# Patient Record
Sex: FEMALE | Race: WHITE | NOT HISPANIC OR LATINO | Employment: FULL TIME | URBAN - METROPOLITAN AREA
[De-identification: names, ages, dates, MRNs, and addresses within clinical notes are randomized per-mention and may not be internally consistent; named-entity substitution may affect disease eponyms.]

---

## 2023-07-25 DIAGNOSIS — R21 RASH: Primary | ICD-10-CM

## 2023-07-25 DIAGNOSIS — E78.00 HYPERCHOLESTEROLEMIA: ICD-10-CM

## 2023-07-27 ENCOUNTER — LAB (OUTPATIENT)
Dept: LAB | Facility: LAB | Age: 68
End: 2023-07-27
Payer: COMMERCIAL

## 2023-07-27 DIAGNOSIS — R21 RASH: ICD-10-CM

## 2023-07-27 DIAGNOSIS — E78.00 HYPERCHOLESTEROLEMIA: ICD-10-CM

## 2023-07-27 LAB
ALANINE AMINOTRANSFERASE (SGPT) (U/L) IN SER/PLAS: 30 U/L (ref 7–45)
ALBUMIN (G/DL) IN SER/PLAS: 4.5 G/DL (ref 3.4–5)
ALKALINE PHOSPHATASE (U/L) IN SER/PLAS: 66 U/L (ref 33–136)
ANION GAP IN SER/PLAS: 14 MMOL/L (ref 10–20)
ASPARTATE AMINOTRANSFERASE (SGOT) (U/L) IN SER/PLAS: 24 U/L (ref 9–39)
BASOPHILS (10*3/UL) IN BLOOD BY AUTOMATED COUNT: 0.04 X10E9/L (ref 0–0.1)
BASOPHILS/100 LEUKOCYTES IN BLOOD BY AUTOMATED COUNT: 0.6 % (ref 0–2)
BILIRUBIN TOTAL (MG/DL) IN SER/PLAS: 0.5 MG/DL (ref 0–1.2)
CALCIUM (MG/DL) IN SER/PLAS: 9.6 MG/DL (ref 8.6–10.6)
CARBON DIOXIDE, TOTAL (MMOL/L) IN SER/PLAS: 29 MMOL/L (ref 21–32)
CHLORIDE (MMOL/L) IN SER/PLAS: 103 MMOL/L (ref 98–107)
CHOLESTEROL (MG/DL) IN SER/PLAS: 203 MG/DL (ref 0–199)
CHOLESTEROL IN HDL (MG/DL) IN SER/PLAS: 53.4 MG/DL
CHOLESTEROL/HDL RATIO: 3.8
CREATININE (MG/DL) IN SER/PLAS: 0.61 MG/DL (ref 0.5–1.05)
EOSINOPHILS (10*3/UL) IN BLOOD BY AUTOMATED COUNT: 0.08 X10E9/L (ref 0–0.7)
EOSINOPHILS/100 LEUKOCYTES IN BLOOD BY AUTOMATED COUNT: 1.1 % (ref 0–6)
ERYTHROCYTE DISTRIBUTION WIDTH (RATIO) BY AUTOMATED COUNT: 13 % (ref 11.5–14.5)
ERYTHROCYTE MEAN CORPUSCULAR HEMOGLOBIN CONCENTRATION (G/DL) BY AUTOMATED: 31.9 G/DL (ref 32–36)
ERYTHROCYTE MEAN CORPUSCULAR VOLUME (FL) BY AUTOMATED COUNT: 96 FL (ref 80–100)
ERYTHROCYTES (10*6/UL) IN BLOOD BY AUTOMATED COUNT: 4.88 X10E12/L (ref 4–5.2)
GFR FEMALE: >90 ML/MIN/1.73M2
GLUCOSE (MG/DL) IN SER/PLAS: 93 MG/DL (ref 74–99)
HEMATOCRIT (%) IN BLOOD BY AUTOMATED COUNT: 46.7 % (ref 36–46)
HEMOGLOBIN (G/DL) IN BLOOD: 14.9 G/DL (ref 12–16)
IMMATURE GRANULOCYTES/100 LEUKOCYTES IN BLOOD BY AUTOMATED COUNT: 0.3 % (ref 0–0.9)
LDL: 107 MG/DL (ref 0–99)
LEUKOCYTES (10*3/UL) IN BLOOD BY AUTOMATED COUNT: 7 X10E9/L (ref 4.4–11.3)
LYMPHOCYTES (10*3/UL) IN BLOOD BY AUTOMATED COUNT: 2.52 X10E9/L (ref 1.2–4.8)
LYMPHOCYTES/100 LEUKOCYTES IN BLOOD BY AUTOMATED COUNT: 35.8 % (ref 13–44)
MONOCYTES (10*3/UL) IN BLOOD BY AUTOMATED COUNT: 0.59 X10E9/L (ref 0.1–1)
MONOCYTES/100 LEUKOCYTES IN BLOOD BY AUTOMATED COUNT: 8.4 % (ref 2–10)
NEUTROPHILS (10*3/UL) IN BLOOD BY AUTOMATED COUNT: 3.79 X10E9/L (ref 1.2–7.7)
NEUTROPHILS/100 LEUKOCYTES IN BLOOD BY AUTOMATED COUNT: 53.8 % (ref 40–80)
NON HDL CHOLESTEROL: 150 MG/DL
NRBC (PER 100 WBCS) BY AUTOMATED COUNT: 0 /100 WBC (ref 0–0)
PLATELETS (10*3/UL) IN BLOOD AUTOMATED COUNT: 264 X10E9/L (ref 150–450)
POTASSIUM (MMOL/L) IN SER/PLAS: 4.3 MMOL/L (ref 3.5–5.3)
PROTEIN TOTAL: 7 G/DL (ref 6.4–8.2)
SODIUM (MMOL/L) IN SER/PLAS: 142 MMOL/L (ref 136–145)
TRIGLYCERIDE (MG/DL) IN SER/PLAS: 211 MG/DL (ref 0–149)
UREA NITROGEN (MG/DL) IN SER/PLAS: 16 MG/DL (ref 6–23)
VLDL: 42 MG/DL (ref 0–40)

## 2023-07-27 PROCEDURE — 36415 COLL VENOUS BLD VENIPUNCTURE: CPT

## 2023-07-27 PROCEDURE — 85025 COMPLETE CBC W/AUTO DIFF WBC: CPT

## 2023-07-27 PROCEDURE — 80061 LIPID PANEL: CPT

## 2023-07-27 PROCEDURE — 80053 COMPREHEN METABOLIC PANEL: CPT

## 2023-11-08 ENCOUNTER — PHARMACY VISIT (OUTPATIENT)
Dept: PHARMACY | Facility: CLINIC | Age: 68
End: 2023-11-08
Payer: COMMERCIAL

## 2023-11-15 ENCOUNTER — CLINICAL SUPPORT (OUTPATIENT)
Dept: ALLERGY | Facility: CLINIC | Age: 68
End: 2023-11-15
Payer: COMMERCIAL

## 2023-11-15 DIAGNOSIS — J30.9 ALLERGIC RHINITIS, UNSPECIFIED SEASONALITY, UNSPECIFIED TRIGGER: ICD-10-CM

## 2023-11-15 DIAGNOSIS — Z91.038 ALLERGY TO INSECT VENOM: ICD-10-CM

## 2023-11-15 PROCEDURE — 95149 ANTIGEN THERAPY SERVICES: CPT | Performed by: ALLERGY & IMMUNOLOGY

## 2023-11-15 PROCEDURE — 95117 IMMUNOTHERAPY INJECTIONS: CPT | Performed by: ALLERGY & IMMUNOLOGY

## 2023-11-26 ASSESSMENT — PROMIS GLOBAL HEALTH SCALE
RATE_GENERAL_HEALTH: EXCELLENT
RATE_AVERAGE_PAIN: 2
RATE_MENTAL_HEALTH: EXCELLENT
CARRYOUT_SOCIAL_ACTIVITIES: EXCELLENT
EMOTIONAL_PROBLEMS: NEVER
RATE_PHYSICAL_HEALTH: VERY GOOD
RATE_QUALITY_OF_LIFE: EXCELLENT
CARRYOUT_PHYSICAL_ACTIVITIES: COMPLETELY
RATE_SOCIAL_SATISFACTION: EXCELLENT

## 2023-11-29 ENCOUNTER — APPOINTMENT (OUTPATIENT)
Dept: ALLERGY | Facility: CLINIC | Age: 68
End: 2023-11-29
Payer: COMMERCIAL

## 2023-11-29 PROBLEM — J30.81 ALLERGIC RHINITIS DUE TO CATS: Status: ACTIVE | Noted: 2023-11-29

## 2023-11-29 PROBLEM — Z91.038 HYMENOPTERA ALLERGY: Status: ACTIVE | Noted: 2023-11-29

## 2023-11-29 PROBLEM — J34.2 NASAL SEPTAL DEVIATION: Status: ACTIVE | Noted: 2023-11-29

## 2023-11-29 PROBLEM — R09.82 POST-NASAL DRAINAGE: Status: ACTIVE | Noted: 2023-11-29

## 2023-11-29 PROBLEM — H40.051 RAISED INTRAOCULAR PRESSURE OF RIGHT EYE: Status: ACTIVE | Noted: 2023-11-29

## 2023-11-29 PROBLEM — D48.5 NEOPLASM OF UNCERTAIN BEHAVIOR OF SKIN: Status: ACTIVE | Noted: 2019-02-08

## 2023-11-29 PROBLEM — K58.9 IRRITABLE BOWEL: Status: ACTIVE | Noted: 2023-11-29

## 2023-11-29 PROBLEM — J34.3 HYPERTROPHY OF BOTH INFERIOR NASAL TURBINATES: Status: ACTIVE | Noted: 2023-11-29

## 2023-11-29 PROBLEM — L70.9 ACNE: Status: ACTIVE | Noted: 2023-11-29

## 2023-11-29 PROBLEM — L71.9 ROSACEA: Status: ACTIVE | Noted: 2019-02-08

## 2023-11-29 PROBLEM — M60.9 STATIN-INDUCED MYOSITIS: Status: ACTIVE | Noted: 2023-11-29

## 2023-11-29 PROBLEM — E04.2 MULTIPLE THYROID NODULES: Status: ACTIVE | Noted: 2023-11-29

## 2023-11-29 PROBLEM — T63.91XA ALLERGY OR TOXIC REACTION TO VENOM: Status: ACTIVE | Noted: 2023-11-29

## 2023-11-29 PROBLEM — E66.9 OBESITY (BMI 30.0-34.9): Status: ACTIVE | Noted: 2023-11-29

## 2023-11-29 PROBLEM — H02.889 MEIBOMIAN GLAND DYSFUNCTION (MGD): Status: ACTIVE | Noted: 2023-11-29

## 2023-11-29 PROBLEM — H52.10 MYOPIA: Status: ACTIVE | Noted: 2023-11-29

## 2023-11-29 PROBLEM — T46.6X5A STATIN-INDUCED MYOSITIS: Status: ACTIVE | Noted: 2023-11-29

## 2023-11-29 PROBLEM — I10 HYPERTENSION: Status: ACTIVE | Noted: 2023-11-29

## 2023-11-29 PROBLEM — E78.5 HYPERLIPIDEMIA: Status: ACTIVE | Noted: 2023-11-29

## 2023-11-29 PROBLEM — R00.2 PALPITATIONS: Status: ACTIVE | Noted: 2023-11-29

## 2023-11-29 PROBLEM — Z91.09 ENVIRONMENTAL ALLERGIES: Status: ACTIVE | Noted: 2023-11-29

## 2023-11-29 PROBLEM — E78.01 FAMILIAL HYPERCHOLESTEROLEMIA: Status: ACTIVE | Noted: 2023-11-29

## 2023-11-29 PROBLEM — J31.0 CHRONIC RHINITIS: Status: ACTIVE | Noted: 2023-11-29

## 2023-11-29 PROBLEM — E55.9 VITAMIN D DEFICIENCY: Status: ACTIVE | Noted: 2023-11-29

## 2023-11-29 PROBLEM — K59.00 CONSTIPATION: Status: ACTIVE | Noted: 2023-11-29

## 2023-11-29 PROBLEM — E66.811 OBESITY (BMI 30.0-34.9): Status: ACTIVE | Noted: 2023-11-29

## 2023-11-29 PROBLEM — Z96.1 PSEUDOPHAKIA OF LEFT EYE: Status: ACTIVE | Noted: 2023-11-29

## 2023-11-29 PROBLEM — J30.1 ALLERGIC RHINITIS DUE TO POLLEN: Status: ACTIVE | Noted: 2023-11-29

## 2023-11-29 PROBLEM — H52.32 ANISOMETROPIA AND ANISEIKONIA: Status: ACTIVE | Noted: 2023-11-29

## 2023-11-29 PROBLEM — Z96.1 PSEUDOPHAKIA OF RIGHT EYE: Status: ACTIVE | Noted: 2023-11-29

## 2023-11-29 PROBLEM — J30.89 ALLERGY TO DUST: Status: ACTIVE | Noted: 2023-11-29

## 2023-11-29 PROBLEM — Z00.00 ANNUAL PHYSICAL EXAM: Status: ACTIVE | Noted: 2023-11-29

## 2023-11-29 PROBLEM — H52.209 ASTIGMATISM: Status: ACTIVE | Noted: 2023-11-29

## 2023-11-29 PROBLEM — H40.003 GLAUCOMA SUSPECT OF BOTH EYES: Status: ACTIVE | Noted: 2023-11-29

## 2023-11-29 PROBLEM — H52.31 ANISOMETROPIA AND ANISEIKONIA: Status: ACTIVE | Noted: 2023-11-29

## 2023-11-29 RX ORDER — ESTRADIOL 0.1 MG/G
CREAM VAGINAL
COMMUNITY
Start: 2015-09-04

## 2023-11-29 RX ORDER — EPINEPHRINE 0.3 MG/.3ML
INJECTION, SOLUTION INTRAMUSCULAR
COMMUNITY
Start: 2018-05-18 | End: 2024-03-04 | Stop reason: SDUPTHER

## 2023-11-29 RX ORDER — ACETAMINOPHEN 500 MG
1 TABLET ORAL DAILY
COMMUNITY
Start: 2014-01-09

## 2023-11-29 RX ORDER — CLOBETASOL PROPIONATE 0.5 MG/G
CREAM TOPICAL 2 TIMES DAILY
COMMUNITY
Start: 2015-09-04

## 2023-11-29 RX ORDER — ALUMINUM ZIRCONIUM OCTACHLOROHYDREX GLY 16 G/100G
GEL TOPICAL
COMMUNITY
Start: 2016-04-21

## 2023-11-29 RX ORDER — HYDROCHLOROTHIAZIDE 25 MG/1
1 TABLET ORAL DAILY
COMMUNITY
Start: 2014-01-09 | End: 2023-11-30 | Stop reason: SDUPTHER

## 2023-11-29 RX ORDER — DESLORATADINE AND PSEUDOEPHEDRINE SULFATE 2.5; 12 MG/1; MG/1
TABLET, EXTENDED RELEASE ORAL EVERY 12 HOURS
COMMUNITY
Start: 2013-11-21

## 2023-11-29 RX ORDER — METFORMIN HYDROCHLORIDE 500 MG/1
2 TABLET ORAL DAILY
COMMUNITY
Start: 2018-05-24 | End: 2023-11-30 | Stop reason: SDUPTHER

## 2023-11-30 ENCOUNTER — OFFICE VISIT (OUTPATIENT)
Dept: PRIMARY CARE | Facility: CLINIC | Age: 68
End: 2023-11-30
Payer: COMMERCIAL

## 2023-11-30 VITALS
OXYGEN SATURATION: 99 % | TEMPERATURE: 97.3 F | HEART RATE: 88 BPM | SYSTOLIC BLOOD PRESSURE: 136 MMHG | WEIGHT: 210 LBS | HEIGHT: 64 IN | DIASTOLIC BLOOD PRESSURE: 81 MMHG | BODY MASS INDEX: 35.85 KG/M2

## 2023-11-30 DIAGNOSIS — J31.0 CHRONIC RHINITIS: ICD-10-CM

## 2023-11-30 DIAGNOSIS — M60.9 STATIN-INDUCED MYOSITIS: ICD-10-CM

## 2023-11-30 DIAGNOSIS — E66.9 OBESITY (BMI 30.0-34.9): ICD-10-CM

## 2023-11-30 DIAGNOSIS — E04.2 MULTIPLE THYROID NODULES: ICD-10-CM

## 2023-11-30 DIAGNOSIS — Z12.39 ENCOUNTER FOR SCREENING FOR MALIGNANT NEOPLASM OF BREAST, UNSPECIFIED SCREENING MODALITY: ICD-10-CM

## 2023-11-30 DIAGNOSIS — I10 HYPERTENSION, UNSPECIFIED TYPE: ICD-10-CM

## 2023-11-30 DIAGNOSIS — T46.6X5A STATIN-INDUCED MYOSITIS: ICD-10-CM

## 2023-11-30 DIAGNOSIS — R21 RASH: ICD-10-CM

## 2023-11-30 DIAGNOSIS — M25.521 ELBOW PAIN, RIGHT: ICD-10-CM

## 2023-11-30 DIAGNOSIS — Z00.00 ANNUAL PHYSICAL EXAM: ICD-10-CM

## 2023-11-30 DIAGNOSIS — E55.9 VITAMIN D DEFICIENCY: ICD-10-CM

## 2023-11-30 DIAGNOSIS — E78.5 HYPERLIPIDEMIA, UNSPECIFIED HYPERLIPIDEMIA TYPE: Primary | ICD-10-CM

## 2023-11-30 PROCEDURE — 1126F AMNT PAIN NOTED NONE PRSNT: CPT | Performed by: INTERNAL MEDICINE

## 2023-11-30 PROCEDURE — 1036F TOBACCO NON-USER: CPT | Performed by: INTERNAL MEDICINE

## 2023-11-30 PROCEDURE — 1159F MED LIST DOCD IN RCRD: CPT | Performed by: INTERNAL MEDICINE

## 2023-11-30 PROCEDURE — 3075F SYST BP GE 130 - 139MM HG: CPT | Performed by: INTERNAL MEDICINE

## 2023-11-30 PROCEDURE — 3079F DIAST BP 80-89 MM HG: CPT | Performed by: INTERNAL MEDICINE

## 2023-11-30 PROCEDURE — 99397 PER PM REEVAL EST PAT 65+ YR: CPT | Performed by: INTERNAL MEDICINE

## 2023-11-30 RX ORDER — DOXYCYCLINE HYCLATE 20 MG
20 TABLET ORAL DAILY
COMMUNITY
Start: 2023-11-09

## 2023-11-30 RX ORDER — ALIROCUMAB 75 MG/ML
INJECTION, SOLUTION SUBCUTANEOUS
COMMUNITY
Start: 2021-03-29 | End: 2023-12-19 | Stop reason: WASHOUT

## 2023-11-30 RX ORDER — CLINDAMYCIN PHOSPHATE 10 MG/G
GEL TOPICAL 2 TIMES DAILY
COMMUNITY
Start: 2020-06-24

## 2023-11-30 RX ORDER — SULFACETAMIDE SODIUM 100 MG/ML
1 LOTION TOPICAL 2 TIMES DAILY
COMMUNITY
End: 2024-05-06 | Stop reason: SDUPTHER

## 2023-11-30 RX ORDER — MELATON/THEAN/VAL/LEM/CHAM/LAV 10MG-200MG
1 TABLET,IMMED, EXTENDED RELEASE, BIPHASIC ORAL DAILY
COMMUNITY

## 2023-11-30 RX ORDER — VITAMIN E (DL,TOCOPHERYL ACET) 45 MG/0.25
1 DROPS ORAL DAILY
COMMUNITY

## 2023-11-30 RX ORDER — HYDROCHLOROTHIAZIDE 25 MG/1
25 TABLET ORAL DAILY
Qty: 90 TABLET | Refills: 3 | Status: SHIPPED | OUTPATIENT
Start: 2023-11-30 | End: 2024-11-29

## 2023-11-30 RX ORDER — OLMESARTAN MEDOXOMIL 5 MG/1
5 TABLET ORAL DAILY
Qty: 30 TABLET | Refills: 11 | Status: SHIPPED | OUTPATIENT
Start: 2023-11-30 | End: 2024-11-29

## 2023-11-30 RX ORDER — METFORMIN HYDROCHLORIDE 500 MG/1
1000 TABLET ORAL DAILY
Qty: 180 TABLET | Refills: 3 | Status: SHIPPED | OUTPATIENT
Start: 2023-11-30 | End: 2024-11-29

## 2023-11-30 RX ORDER — TRIAMCINOLONE ACETONIDE 1 MG/G
CREAM TOPICAL
Qty: 80 G | Refills: 0 | Status: SHIPPED | OUTPATIENT
Start: 2023-11-30 | End: 2024-11-29

## 2023-11-30 ASSESSMENT — ENCOUNTER SYMPTOMS
DEPRESSION: 0
LOSS OF SENSATION IN FEET: 0
OCCASIONAL FEELINGS OF UNSTEADINESS: 0

## 2023-11-30 NOTE — PROGRESS NOTES
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  HPI  68 year old female here for annual exam.  She has cut down on her hours to watch her granddaughter Perla on .  She loves horses and rode on them.  Her daughter Jackie is pregnant and it has been a stressful pregnancy.  Her son in law Musa's dad  suddenly the day after , .  Her mom age 89 had a cath and a stent in August, also TAVR by [facility]. Her course was complicated by bleeding and hemoglobin of 6.  She is doing great post transfusion.  She went to [place] with her sister. Before her mom had her procedure she had a syncopal episode and Nicole caught her and she has right arm medial epicondyle pain.  It hurts when she carries Perla. It has been better the last 10 days.  She also did something to her ankle in the summer as she was running to get the dog in the dark and ran into the [employer] chair and has had pain dorsal aspect of foot.  She got a rash on the Repatha where she injected the shot and now has approval to switch to the Praluent.  That gave her a rash as well but not as bad.  Since back on the praluent less occurrence.  Using the triamcinolone cream helps with the rash. She still gets her allergy shots but hard to get in when her mom was sick.  She had cataract surgery and she is now near sighted.  She has noted her cramps are improved if she drinks enough water and her legs do not swell as much.  It also helps her constipation too.  Her son Omer is in [place].  He has a new partner. She has not been checking her pressures.  Also notes that she loves salt and briny food such as had ata last night.  Put on the doxycycline for her rosacea.  Saw dermatologist.   ROS were reviewed and are negative with the exception of what is noted in HPI    There were no vitals taken for this visit.  Objective   Physical Exam  HENT:      Head:  Normocephalic and atraumatic.      Right Ear: Tympanic membrane normal.      Left Ear: Tympanic membrane normal.      Mouth/Throat:      Mouth: Mucous membranes are moist.      Pharynx: No oropharyngeal exudate or posterior oropharyngeal erythema.   Eyes:      Extraocular Movements: Extraocular movements intact.      Conjunctiva/sclera: Conjunctivae normal.      Pupils: Pupils are equal, round, and reactive to light.   Cardiovascular:      Rate and Rhythm: Normal rate and regular rhythm.      Heart sounds: No murmur heard.     No friction rub. No gallop.   Pulmonary:      Effort: Pulmonary effort is normal.      Breath sounds: No wheezing, rhonchi or rales.   Abdominal:      General: Bowel sounds are normal.      Palpations: Abdomen is soft.      Tenderness: There is no abdominal tenderness.   Musculoskeletal:         General: No swelling.      Cervical back: Neck supple.   Lymphadenopathy:      Cervical: No cervical adenopathy.   Neurological:      Mental Status: She is alert.     Breast:  no masses, no discharge    Assessment/Plan   Problem List Items Addressed This Visit    None    Provider Impressions     1. Palpitations, atrial tachycardia episodes and PSVT but not high burden, she knows that the use of daily pseudoephedrine can contribute to her palpitations as can coffee. They are now gone with less coffee and more water.    2. Statin induced myositis, familial hyperlipidemia - appreciate input from Susanne Oconnell. Now on PCSK9 PRaluent with 30% drop in lipids. Copay is 25.00. THank goodness her CT scan of coronary arteries was okay 2019.   3 HTN - Has been about the same at home and she will continue to check.  She loves salt.  If persistently above 130/80 past we discussed her starting ARB and would like to start olmesartan today. Will check renal profile in 2 weeks and keep tabs on her BP for me as well.   Encourage healthy habits  4. Aortic sclerosis - per up to date control for cardiac risk factors and  follow ECHO every 2-5 years as increased risk for CAD and progression to aortic stenosis. Last one 9/22  5. Multinodular goiter - s/p biopsy of two nodules, benign follicular nodule. Ultrasound stable in October, 2016 and October 2020.   6. ZENON - taking decongestant and antihistamine at bedtime helps. Working on weight loss. Nasal strips helped. Sleeps on side.   7. Knee osteoarthritis, Bakers cyst.- much better with exercise.   8. Allergic rhinitis - allergy shots help her as does her antihistamine, decongestant which still feel would be great to wean eventually.   9. Bee sting allergy - has epi pens  10. Vitamin D deficiency - takes daily dose, dexa 2/21 was good.   11. BPV- did canalith repositioning maneuvers successfully in past  12. Rosacea  13. Irritable bowel - In past good response to stool softener and bennefiber. ALIGN and magnesium help as well. Still takes daily calcium and may impact bowels as well.   13.  Weight challenges - a constant challenge.  We had discussed the use of GLP1 agonist.  Recheck hemoglobin A1C with labs.  Discussed healthy habits. So pleased she still has the .    14.   Elbow - suspect tendonitis.  Referral to [facility] or [facility].   15.  Health maintenance   - PAP 2021 per Imtiaz with vaginal atrophy,   - colonoscopy 2009, 2019 and positive family history so next one 2024   - mammogram 9/22  - immunizations current except shingrix due. Had COVID and flu vaccination.   - bone density excellent 2021  - Full physical exam November 2023,     Irma Hayward MD

## 2023-11-30 NOTE — PATIENT INSTRUCTIONS
Patient Discussion/Summary     1. Great to see you always  2. Good luck on the new baby  3. Great job on the water  4. Thanks for being consistent with the .   5.  On exam today, your pressure slightly up, crepitus left knee, systolic heart murmur.  6. Blood work ordered  7. Shingles vaccine when you can take the time.   8. We added olmesartan to your regimen to see if we can get the numbers down a bit.  Would start with 1/2 pill.  Also of course continue the exercise and proper diet.    9. Thanks for continuing to care for yourself and fighting back with health smart activities. Be an advocate for yourself regarding work.  10.  See you in a year unless you need me sooner.

## 2023-12-01 ENCOUNTER — SPECIALTY PHARMACY (OUTPATIENT)
Dept: PHARMACY | Facility: CLINIC | Age: 68
End: 2023-12-01

## 2023-12-01 ENCOUNTER — PHARMACY VISIT (OUTPATIENT)
Dept: PHARMACY | Facility: CLINIC | Age: 68
End: 2023-12-01
Payer: COMMERCIAL

## 2023-12-01 PROCEDURE — RXMED WILLOW AMBULATORY MEDICATION CHARGE

## 2023-12-03 PROBLEM — R21 RASH: Status: ACTIVE | Noted: 2023-12-03

## 2023-12-03 PROBLEM — M25.521 ELBOW PAIN, RIGHT: Status: ACTIVE | Noted: 2023-12-03

## 2023-12-06 ENCOUNTER — APPOINTMENT (OUTPATIENT)
Dept: ALLERGY | Facility: CLINIC | Age: 68
End: 2023-12-06
Payer: COMMERCIAL

## 2023-12-13 ENCOUNTER — CLINICAL SUPPORT (OUTPATIENT)
Dept: ALLERGY | Facility: CLINIC | Age: 68
End: 2023-12-13
Payer: COMMERCIAL

## 2023-12-13 ENCOUNTER — APPOINTMENT (OUTPATIENT)
Dept: ALLERGY | Facility: CLINIC | Age: 68
End: 2023-12-13
Payer: COMMERCIAL

## 2023-12-13 DIAGNOSIS — J30.9 ALLERGIC RHINITIS, UNSPECIFIED SEASONALITY, UNSPECIFIED TRIGGER: ICD-10-CM

## 2023-12-13 DIAGNOSIS — Z91.038 ALLERGY TO INSECT VENOM: ICD-10-CM

## 2023-12-13 PROCEDURE — 95149 ANTIGEN THERAPY SERVICES: CPT | Performed by: ALLERGY & IMMUNOLOGY

## 2023-12-13 PROCEDURE — 95117 IMMUNOTHERAPY INJECTIONS: CPT | Performed by: ALLERGY & IMMUNOLOGY

## 2023-12-19 ENCOUNTER — ANCILLARY PROCEDURE (OUTPATIENT)
Dept: RADIOLOGY | Facility: CLINIC | Age: 68
End: 2023-12-19
Payer: COMMERCIAL

## 2023-12-19 ENCOUNTER — OFFICE VISIT (OUTPATIENT)
Dept: CARDIOLOGY | Facility: CLINIC | Age: 68
End: 2023-12-19
Payer: COMMERCIAL

## 2023-12-19 VITALS
SYSTOLIC BLOOD PRESSURE: 142 MMHG | HEIGHT: 64 IN | WEIGHT: 209 LBS | DIASTOLIC BLOOD PRESSURE: 81 MMHG | OXYGEN SATURATION: 100 % | BODY MASS INDEX: 35.68 KG/M2 | HEART RATE: 83 BPM

## 2023-12-19 DIAGNOSIS — I10 HYPERTENSION, UNSPECIFIED TYPE: ICD-10-CM

## 2023-12-19 DIAGNOSIS — Z12.39 ENCOUNTER FOR SCREENING FOR MALIGNANT NEOPLASM OF BREAST, UNSPECIFIED SCREENING MODALITY: ICD-10-CM

## 2023-12-19 DIAGNOSIS — E78.49 OTHER HYPERLIPIDEMIA: Primary | ICD-10-CM

## 2023-12-19 PROCEDURE — 1159F MED LIST DOCD IN RCRD: CPT | Performed by: INTERNAL MEDICINE

## 2023-12-19 PROCEDURE — 99214 OFFICE O/P EST MOD 30 MIN: CPT | Performed by: INTERNAL MEDICINE

## 2023-12-19 PROCEDURE — 93010 ELECTROCARDIOGRAM REPORT: CPT | Performed by: INTERNAL MEDICINE

## 2023-12-19 PROCEDURE — 77067 SCR MAMMO BI INCL CAD: CPT

## 2023-12-19 PROCEDURE — 93005 ELECTROCARDIOGRAM TRACING: CPT | Performed by: INTERNAL MEDICINE

## 2023-12-19 PROCEDURE — 77067 SCR MAMMO BI INCL CAD: CPT | Performed by: STUDENT IN AN ORGANIZED HEALTH CARE EDUCATION/TRAINING PROGRAM

## 2023-12-19 PROCEDURE — 3077F SYST BP >= 140 MM HG: CPT | Performed by: INTERNAL MEDICINE

## 2023-12-19 PROCEDURE — 1160F RVW MEDS BY RX/DR IN RCRD: CPT | Performed by: INTERNAL MEDICINE

## 2023-12-19 PROCEDURE — 1126F AMNT PAIN NOTED NONE PRSNT: CPT | Performed by: INTERNAL MEDICINE

## 2023-12-19 PROCEDURE — 1036F TOBACCO NON-USER: CPT | Performed by: INTERNAL MEDICINE

## 2023-12-19 PROCEDURE — 77063 BREAST TOMOSYNTHESIS BI: CPT | Performed by: STUDENT IN AN ORGANIZED HEALTH CARE EDUCATION/TRAINING PROGRAM

## 2023-12-19 PROCEDURE — 3079F DIAST BP 80-89 MM HG: CPT | Performed by: INTERNAL MEDICINE

## 2023-12-19 ASSESSMENT — PAIN SCALES - GENERAL: PAINLEVEL: 0-NO PAIN

## 2023-12-19 NOTE — PROGRESS NOTES
Chief Complaint:   No chief complaint on file.     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 Oconnell in 12/2022.   Last year, she was having significant side effects with rashes while on Repatha and was switched to Parluent which she is tolerating better. Still having some injection site rashes which she uses triamcinalone cream for. She feels the side effects have been getting better over time. Denies chest pain, shortness of breath, palpitations. Does have LE edema for which she wears compression stockings. She occasionally checks her blood pressure at home and states it was last 130/80s. She takes the hydrochlorothiazide everyday and has not started the olmesartan (prescribed by PCP) yet. She feels that her BP is worse today due to increased salt intake and recent stressors. 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 Had palpitations likely from caffeine earlier last year. TTE and event monitor unremarkable (brief runs of AT). Palpitations have resolved with cutting back on coffee and drinking more water. Strong family hx of CAD: Dad CABG age 64, paternal brothers premature CAD, Mom DLD, and maternal aunt CABG and stents. Difficulty taking statins. Atorvastatin caused crippling myalgias, fluvastatin caused abdominal pain and elevated LFTs, and rosuvastatin caused significant elevation in CPK to 2589 in 2019.      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  -HELLP syndrome     PRIOR CARDIAC TESTING:  -TTE (): EF 60-65%, aortic sclerosis  -CAD (): 0  -TTE (): EF 60-65%, no valvular abnormalities, normal study       Last Recorded Vitals:  Vitals:    23 1115   BP: 142/81   Pulse: 83   SpO2: 100%        Past Medical History:  She has a past medical history of Abnormal levels of other serum enzymes (2019), Acute frontal sinusitis, unspecified (2016), Acute upper respiratory infection, unspecified (06/10/2018), Acute vaginitis (2015), Allergic rhinitis due to pollen (2016), Allergy status to unspecified drugs, medicaments and biological substances (2016), Anaphylactic shock, unspecified, initial encounter (2015), Asymptomatic menopausal state (2016), Cellulitis of unspecified part of limb (2019), Combined forms of age-related cataract, right eye (2022), 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), Cough, unspecified (2014), Dietary counseling and surveillance (2018), Elevated blood-pressure reading, without diagnosis of hypertension (2017), Encounter for gynecological examination (general) (routine) without abnormal findings (2016), Encounter for gynecological examination (general) (routine) without abnormal findings (2015), Encounter for other screening for malignant neoplasm of breast (2019), Encounter for screening for malignant neoplasm of colon (2018), Encounter for screening for osteoporosis (10/01/2020), Encounter for screening for respiratory tuberculosis (2016), Encounter for screening mammogram for malignant neoplasm of breast (10/27/2016), Encounter for screening mammogram for malignant neoplasm of breast (2015), Encounter for therapeutic drug level monitoring (2019), Lichen sclerosus et atrophicus (2017), Localized swelling, mass and lump, head (01/10/2020),  Nontoxic single thyroid nodule (2019), Other allergy status, other than to drugs and biological substances, Other hemoglobinopathies (CMS/HCC) (2019), Other specified disorders of bone, unspecified site (2018), Other specified personal risk factors, not elsewhere classified (2015), Other specified soft tissue disorders (2016), Pain in left hand (2021), Personal history of other diseases of the circulatory system, Personal history of other diseases of the nervous system and sense organs (2021), Personal history of other diseases of the respiratory system (2020), Personal history of other diseases of the respiratory system (2018), Personal history of other drug therapy (10/09/2019), Personal history of other drug therapy (2022), Personal history of other endocrine, nutritional and metabolic disease, Personal history of other endocrine, nutritional and metabolic disease, Personal history of other medical treatment (10/11/2017), Personal history of other specified conditions (2022), Personal history of other specified conditions, Personal history of other specified conditions (2017), Polyp of cervix uteri (2015), Postnasal drip (2022), Preglaucoma, unspecified, unspecified eye (2017), Rash and other nonspecific skin eruption (10/12/2015), Sprain of unspecified site of right knee, initial encounter (2014), Toxic effect of venom of hornets, accidental (unintentional), initial encounter (2016), Unspecified disorder of refraction (2017), Unspecified injury of unspecified lower leg, initial encounter (2019), and Unspecified injury of unspecified wrist, hand and finger(s), initial encounter (2017).    Past Surgical History:  She has a past surgical history that includes  section, classic (2016); Tonsillectomy (2016); Knee surgery (2016); Other surgical history (2022); and  Other surgical history (02/24/2022).      Social History:  She reports that she has never smoked. She has never been exposed to tobacco smoke. She has never used smokeless tobacco. No history on file for alcohol use and drug use.    Family History:  No family history on file.     Allergies:  Lisinopril, Prochlorperazine, Ramipril, Statins-hmg-coa reductase inhibitors, Cephalexin, and Povidone-iodine    Outpatient Medications:  Current Outpatient Medications   Medication Instructions    alirocumab (Praluent Pen) 75 mg/mL pen injector subcutaneous    alirocumab 75 mg/mL pen injector INJECT 75MG (1 PEN) UNDER THE SKIN ONCE EVERY 2 WEEKS AS DIRECTED    Bifidobacterium infantis (Align) 4 mg capsule oral    calcium carb and citrate-vitD3 (Citracal-D3 Slow Release) 600 mg-12.5 mcg (500 unit) tablet extended release 1 tablet, oral, Daily    calcium-magnesium-herbal 180 167- mg tablet oral    cholecalciferol (Vitamin D-3) 5,000 Units tablet 1 tablet, oral, Daily    clindamycin (Cleocin T) 1 % gel Topical, 2 times daily    clobetasol (Temovate) 0.05 % cream 2 times daily    desloratadine-pseudoephedrine (Clarinex-D 12 HOUR) 2.5-120 mg 12 hr tablet oral, Every 12 hours    doxycycline (PERIOSTAT) 20 mg, oral, Daily    EPINEPHrine (Auvi-Q) 0.3 mg/0.3 mL injection syringe inject intramuscularly as directed for anaphylaxis    estradiol (Estrace) 0.01 % (0.1 mg/gram) vaginal cream vaginal    folic acid/vit B complex and C (B complex-vitamin C-folic acid) 400 mcg tablet extended release 1 tablet, oral, Daily    hydroCHLOROthiazide (HYDRODIURIL) 25 mg, oral, Daily    metFORMIN (GLUCOPHAGE) 1,000 mg, oral, Daily    olmesartan (BENICAR) 5 mg, oral, Daily    sulfacetamide suspension (Klaron) 10 % suspension topical 1 Application, Topical, 2 times daily    triamcinolone (Kenalog) 0.1 % cream Apply to affected area 1-2 times daily as needed. Avoid face and groin.       Physical Exam:  General: NAD, resting in chair  comfortably  HEENT: NCAT, no scleral icterus  Heart: RRR, normal s1/s2, 2/6 systolic murmur  Lungs: CTAB, no rales/ronchi/wheezing  Extremities: trace bl LE edema      Last Labs:  CBC -  Lab Results   Component Value Date    WBC 7.0 07/27/2023    HGB 14.9 07/27/2023    HCT 46.7 (H) 07/27/2023    MCV 96 07/27/2023     07/27/2023       CMP -  Lab Results   Component Value Date    CALCIUM 9.6 07/27/2023    PROT 7.0 07/27/2023    ALBUMIN 4.5 07/27/2023    AST 24 07/27/2023    ALT 30 07/27/2023    ALKPHOS 66 07/27/2023    BILITOT 0.5 07/27/2023       LIPID PANEL -   Lab Results   Component Value Date    CHOL 203 (H) 07/27/2023    TRIG 211 (H) 07/27/2023    HDL 53.4 07/27/2023    CHHDL 3.8 07/27/2023    LDLF 107 (H) 07/27/2023    VLDL 42 (H) 07/27/2023    NHDL 150 07/27/2023       RENAL FUNCTION PANEL -   Lab Results   Component Value Date    GLUCOSE 93 07/27/2023     07/27/2023    K 4.3 07/27/2023     07/27/2023    CO2 29 07/27/2023    ANIONGAP 14 07/27/2023    BUN 16 07/27/2023    CREATININE 0.61 07/27/2023    CALCIUM 9.6 07/27/2023    ALBUMIN 4.5 07/27/2023        Assessment/Plan     66 yo F with hx of HTN and DLD with likely FH here for advanced lipid management. Has significant intolerances to statins including myalgias and myositis with CK elevation. LDL from 122 to 107 with normal LFTs and elevated . Due to injection site rash with Repatha, switched to Praluent and tolerating it better.     #DLD with likely HeFH  -, , Cholesterol 203, HDL 53 (7/27/23)  -Continue Praluent 75mg   -Follow up repeat lipid panel     #HTN  -/81 (12/19/23); runs 130/80s at home   -Start Olmesartan 5 mg and continue hydrochlorothiazide 25 mg daily   -Follow up CMP     RTC 1 year     Discussed with Dr. Oconnell who agrees with plan.     Katina Gibbons MD

## 2023-12-20 ENCOUNTER — APPOINTMENT (OUTPATIENT)
Dept: ALLERGY | Facility: CLINIC | Age: 68
End: 2023-12-20
Payer: COMMERCIAL

## 2023-12-20 ENCOUNTER — CLINICAL SUPPORT (OUTPATIENT)
Dept: ALLERGY | Facility: CLINIC | Age: 68
End: 2023-12-20
Payer: COMMERCIAL

## 2023-12-20 DIAGNOSIS — J30.1 ALLERGIC RHINITIS DUE TO POLLEN, UNSPECIFIED SEASONALITY: ICD-10-CM

## 2023-12-20 DIAGNOSIS — T63.91XS TOXIC EFFECT OF VENOM, ACCIDENTAL OR UNINTENTIONAL, SEQUELA: ICD-10-CM

## 2023-12-20 PROCEDURE — 95149 ANTIGEN THERAPY SERVICES: CPT | Performed by: ALLERGY & IMMUNOLOGY

## 2023-12-20 PROCEDURE — 95117 IMMUNOTHERAPY INJECTIONS: CPT | Performed by: ALLERGY & IMMUNOLOGY

## 2023-12-28 ENCOUNTER — PHARMACY VISIT (OUTPATIENT)
Dept: PHARMACY | Facility: CLINIC | Age: 68
End: 2023-12-28
Payer: COMMERCIAL

## 2023-12-28 PROCEDURE — RXMED WILLOW AMBULATORY MEDICATION CHARGE

## 2023-12-29 ENCOUNTER — SPECIALTY PHARMACY (OUTPATIENT)
Dept: PHARMACY | Facility: CLINIC | Age: 68
End: 2023-12-29

## 2024-01-02 LAB
ATRIAL RATE: 82 BPM
P AXIS: 33 DEGREES
P OFFSET: 206 MS
P ONSET: 155 MS
PR INTERVAL: 138 MS
Q ONSET: 224 MS
QRS COUNT: 13 BEATS
QRS DURATION: 96 MS
QT INTERVAL: 374 MS
QTC CALCULATION(BAZETT): 436 MS
QTC FREDERICIA: 415 MS
R AXIS: -28 DEGREES
T AXIS: 6 DEGREES
T OFFSET: 411 MS
VENTRICULAR RATE: 82 BPM

## 2024-01-11 ENCOUNTER — OFFICE VISIT (OUTPATIENT)
Dept: ORTHOPEDIC SURGERY | Facility: CLINIC | Age: 69
End: 2024-01-11
Payer: COMMERCIAL

## 2024-01-11 VITALS — BODY MASS INDEX: 35.68 KG/M2 | WEIGHT: 209 LBS | HEIGHT: 64 IN

## 2024-01-11 DIAGNOSIS — M19.049 CMC ARTHRITIS: ICD-10-CM

## 2024-01-11 DIAGNOSIS — S46.211A RUPTURE OF DISTAL BICEPS TENDON, RIGHT, INITIAL ENCOUNTER: Primary | ICD-10-CM

## 2024-01-11 PROCEDURE — 1036F TOBACCO NON-USER: CPT | Performed by: ORTHOPAEDIC SURGERY

## 2024-01-11 PROCEDURE — 1126F AMNT PAIN NOTED NONE PRSNT: CPT | Performed by: ORTHOPAEDIC SURGERY

## 2024-01-11 PROCEDURE — 99203 OFFICE O/P NEW LOW 30 MIN: CPT | Performed by: ORTHOPAEDIC SURGERY

## 2024-01-11 PROCEDURE — 1160F RVW MEDS BY RX/DR IN RCRD: CPT | Performed by: ORTHOPAEDIC SURGERY

## 2024-01-11 PROCEDURE — 99213 OFFICE O/P EST LOW 20 MIN: CPT | Performed by: ORTHOPAEDIC SURGERY

## 2024-01-11 PROCEDURE — L3924 HFO WITHOUT JOINTS PRE OTS: HCPCS | Performed by: ORTHOPAEDIC SURGERY

## 2024-01-11 ASSESSMENT — PAIN SCALES - GENERAL: PAINLEVEL_OUTOF10: 5 - MODERATE PAIN

## 2024-01-11 ASSESSMENT — PAIN - FUNCTIONAL ASSESSMENT: PAIN_FUNCTIONAL_ASSESSMENT: 0-10

## 2024-01-11 ASSESSMENT — PAIN DESCRIPTION - DESCRIPTORS: DESCRIPTORS: ACHING

## 2024-01-11 NOTE — PROGRESS NOTES
Nicole is a 68 y.o.-year-old right hand dominant female presenting today for evaluation of her right elbow and thumb    In August 2023 she tried to reach out and catch her mo who was falling and subsequently strained her right elbow. She did not feel a pop, but had a lot of pain early on that has resolved somewhat. She is still experiencing pain and weakness. She has not had any formal workup or treatment for this. She does use OTC medications and has done activity modifications to manage her symptoms.     She also has had long standing pain at base of her right thumb that has gotten worse since her injury.     Please refer to New Patient Intake Form scanned into patient's electronic record for self-reported past medical history, past surgical history, medications, allergies, family history, social history and 10 point review of systems.          Examination:     Constitutional: Oriented to person, place, and time.     Appears well-developed and well-nourished.     Head: Normocephalic and atraumatic.     Eyes: Pupils are equal, round, and reactive to light.     Cardiovascular: Intact distal pulses.     Pulmonary/Chest/Breast: Effort normal. No respiratory distress.     Neurological: Alert and oriented to person, place, and time.     Skin: Skin is warm and dry.     Psychiatric: normal mood and affect. Behavior is normal.     Musculoskeletal:  Normal appearance of the hand and elbow. Distal bicep tendn is intact. Tenderness to palpation of the antecubital fossa. Pain with resisted elbow flexion and reisisted forearm supination. Full digital and wrist range of motion. No thenar or intrinsic atrophy. Sensation subjectively normal. Degenerative changes at base of right thumb.         No imaging taken today.          Impression:  Suspected partial distal bicep rupture and right thumb CMC joinit arthritis            Plan: Recommendations of obtaining an MRI of the elbow to evaluate the soft tissue structures around the  biceps anchor. This will help direct future treatment recommendations. For her right thumb we provided her with a comfort cool brace. She declined injections today. She will follow up by phone or email when MRI is completed to discuss further treatment options    Patient was prescribed a Comfort Cool for CMC arthritis. The patient has weakness, instability and/or deformity of their right thumb which requires stabilization from this orthosis to improve their function.      Verbal and written instructions for the use, wear schedule, cleaning and application of this item were given.  Patient was instructed that should the brace result in increased pain, decreased sensation, increased swelling, or an overall worsening of their medical condition, to please contact our office immediately.     Orthotic management and training was provided for skin care, modifications due to healing tissues, edema changes, interruption in skin integrity, and safety precautions with the orthosis.         Antwan Jenkins MD          [employer] [employer]     Department of Orthopaedic Surgery     Chief of Hand and Upper Extremity Surgery     [facility]      Scribe Attestation  By signing my name below, IAislinn , Scribector   attest that this documentation has been prepared under the direction and in the presence of Antwan Jenkins MD.

## 2024-01-18 ENCOUNTER — SPECIALTY PHARMACY (OUTPATIENT)
Dept: PHARMACY | Facility: CLINIC | Age: 69
End: 2024-01-18

## 2024-01-25 PROCEDURE — RXMED WILLOW AMBULATORY MEDICATION CHARGE

## 2024-01-26 ENCOUNTER — PHARMACY VISIT (OUTPATIENT)
Dept: PHARMACY | Facility: CLINIC | Age: 69
End: 2024-01-26
Payer: COMMERCIAL

## 2024-01-27 ENCOUNTER — HOSPITAL ENCOUNTER (OUTPATIENT)
Dept: RADIOLOGY | Facility: CLINIC | Age: 69
Discharge: HOME | End: 2024-01-27
Payer: COMMERCIAL

## 2024-01-27 DIAGNOSIS — S46.211A RUPTURE OF DISTAL BICEPS TENDON, RIGHT, INITIAL ENCOUNTER: ICD-10-CM

## 2024-01-27 PROCEDURE — 73221 MRI JOINT UPR EXTREM W/O DYE: CPT | Mod: RT

## 2024-01-27 PROCEDURE — 73221 MRI JOINT UPR EXTREM W/O DYE: CPT | Mod: RIGHT SIDE | Performed by: RADIOLOGY

## 2024-02-07 ENCOUNTER — CLINICAL SUPPORT (OUTPATIENT)
Dept: ALLERGY | Facility: CLINIC | Age: 69
End: 2024-02-07
Payer: COMMERCIAL

## 2024-02-07 DIAGNOSIS — J30.2 SEASONAL ALLERGIES: ICD-10-CM

## 2024-02-07 PROCEDURE — 95146 ANTIGEN THERAPY SERVICES: CPT | Performed by: ALLERGY & IMMUNOLOGY

## 2024-02-07 PROCEDURE — 95117 IMMUNOTHERAPY INJECTIONS: CPT | Performed by: ALLERGY & IMMUNOLOGY

## 2024-02-14 ENCOUNTER — CLINICAL SUPPORT (OUTPATIENT)
Dept: ALLERGY | Facility: CLINIC | Age: 69
End: 2024-02-14
Payer: COMMERCIAL

## 2024-02-14 DIAGNOSIS — Z91.038 ALLERGY TO INSECT VENOM: ICD-10-CM

## 2024-02-14 DIAGNOSIS — J30.2 SEASONAL ALLERGIES: ICD-10-CM

## 2024-02-14 PROCEDURE — 95149 ANTIGEN THERAPY SERVICES: CPT | Performed by: ALLERGY & IMMUNOLOGY

## 2024-02-14 PROCEDURE — 95117 IMMUNOTHERAPY INJECTIONS: CPT | Performed by: ALLERGY & IMMUNOLOGY

## 2024-02-19 ENCOUNTER — SPECIALTY PHARMACY (OUTPATIENT)
Dept: PHARMACY | Facility: CLINIC | Age: 69
End: 2024-02-19

## 2024-02-29 ENCOUNTER — CLINICAL SUPPORT (OUTPATIENT)
Dept: ALLERGY | Facility: CLINIC | Age: 69
End: 2024-02-29
Payer: COMMERCIAL

## 2024-02-29 DIAGNOSIS — Z91.038 ALLERGY TO INSECT VENOM: ICD-10-CM

## 2024-02-29 DIAGNOSIS — T63.91XA ALLERGIC REACTION TO VENOM: ICD-10-CM

## 2024-02-29 DIAGNOSIS — J30.2 SEASONAL ALLERGIES: ICD-10-CM

## 2024-02-29 PROCEDURE — 95149 ANTIGEN THERAPY SERVICES: CPT | Performed by: ALLERGY & IMMUNOLOGY

## 2024-02-29 PROCEDURE — 95117 IMMUNOTHERAPY INJECTIONS: CPT | Performed by: ALLERGY & IMMUNOLOGY

## 2024-03-01 PROCEDURE — RXMED WILLOW AMBULATORY MEDICATION CHARGE

## 2024-03-04 DIAGNOSIS — T78.2XXS ANAPHYLAXIS, SEQUELA: Primary | ICD-10-CM

## 2024-03-04 RX ORDER — EPINEPHRINE 0.3 MG/.3ML
INJECTION, SOLUTION INTRAMUSCULAR
Qty: 1 EACH | Refills: 0 | Status: SHIPPED | OUTPATIENT
Start: 2024-03-04

## 2024-03-12 ENCOUNTER — PHARMACY VISIT (OUTPATIENT)
Dept: PHARMACY | Facility: CLINIC | Age: 69
End: 2024-03-12
Payer: COMMERCIAL

## 2024-03-18 ASSESSMENT — CUP TO DISC RATIO
OS_RATIO: 0.5
OD_RATIO: 0.5

## 2024-03-18 ASSESSMENT — SLIT LAMP EXAM - LIDS
COMMENTS: GOOD POSITION
COMMENTS: GOOD POSITION

## 2024-03-18 ASSESSMENT — EXTERNAL EXAM - LEFT EYE: OS_EXAM: NORMAL

## 2024-03-18 ASSESSMENT — EXTERNAL EXAM - RIGHT EYE: OD_EXAM: NORMAL

## 2024-03-18 NOTE — PROGRESS NOTES
[id number]  [id number]  -New Rx given per patient request - optional to fill  -Progressives and computer glasses.     Glaucoma suspect of both eyesH40.003  -No FH of glaucoma  -Increased cup to disc ratio  -Pachymetry (3/3/23) - 575/581.   -OCT RNFL (3/22/24) - SS: 8/10 OD and 9/10 OS. WNL OU. 86/87. Stable from 3/9/22.   -Low suspicion/low risk for glaucoma. No treatment necessary at this time. Monitor IOP carefully perioperatively. F/u 1 year - comprehensive exam and OCT RNFL.     Pseudophakia of left eyeZ96.1 - 6/9/22  Pseudophakia of right eyeZ96.1 - 4/21/22  -Doing well. Good vision. IOP good. Off all gtts.     Meibomian gland dysfunction (MGD)H02.889  -Postoperatively had redness and soreness of upper > lower eyelid margins - resolved with warm compresses, prednisolone and ofloxacin drops.   -Now with dry eyes and allergic conjunctivitis  -Continue artificial tears PRN: Refresh, Systane, Theratears, Genteal, Blink  -Takes Clarinex. Receives immunotherapy. May use artificial tears daily and can use: Azelastine OU BID PRN itching/allergies.       No history of refractive surgery.   No FH of AMD/glaucoma

## 2024-03-22 ENCOUNTER — OFFICE VISIT (OUTPATIENT)
Dept: OPHTHALMOLOGY | Facility: CLINIC | Age: 69
End: 2024-03-22
Payer: COMMERCIAL

## 2024-03-22 DIAGNOSIS — H40.003 GLAUCOMA SUSPECT OF BOTH EYES: ICD-10-CM

## 2024-03-22 DIAGNOSIS — H52.4 PRESBYOPIA: ICD-10-CM

## 2024-03-22 DIAGNOSIS — H52.203 ASTIGMATISM OF BOTH EYES, UNSPECIFIED TYPE: Primary | ICD-10-CM

## 2024-03-22 DIAGNOSIS — Z96.1 PSEUDOPHAKIA: ICD-10-CM

## 2024-03-22 DIAGNOSIS — H02.889 MEIBOMIAN GLAND DYSFUNCTION: ICD-10-CM

## 2024-03-22 PROCEDURE — 92015 DETERMINE REFRACTIVE STATE: CPT | Performed by: OPHTHALMOLOGY

## 2024-03-22 PROCEDURE — 92014 COMPRE OPH EXAM EST PT 1/>: CPT | Performed by: OPHTHALMOLOGY

## 2024-03-22 ASSESSMENT — REFRACTION_WEARINGRX
OD_AXIS: 166
SPECS_TYPE: PAL
OS_ADD: +2.25
OS_AXIS: 176
OS_CYLINDER: -1.00
OD_CYLINDER: -1.25
SPECS_TYPE: COMPUTER
OS_CYLINDER: -1.00
OD_SPHERE: +1.00
OD_ADD: +1.75
OS_SPHERE: +1.25
OD_CYLINDER: -1.25
OS_SPHERE: +0.50
OD_AXIS: 163
OD_ADD: +2.25
OS_ADD: +1.75
OS_AXIS: 179
OD_SPHERE: +1.75

## 2024-03-22 ASSESSMENT — ENCOUNTER SYMPTOMS
ALLERGIC/IMMUNOLOGIC NEGATIVE: 0
RESPIRATORY NEGATIVE: 0
HEMATOLOGIC/LYMPHATIC NEGATIVE: 0
PSYCHIATRIC NEGATIVE: 0
GASTROINTESTINAL NEGATIVE: 0
CARDIOVASCULAR NEGATIVE: 0
EYES NEGATIVE: 1
MUSCULOSKELETAL NEGATIVE: 0
CONSTITUTIONAL NEGATIVE: 0
NEUROLOGICAL NEGATIVE: 0
ENDOCRINE NEGATIVE: 0

## 2024-03-22 ASSESSMENT — VISUAL ACUITY
OD_SC+: -1
OS_SC+: +2
OS_SC: 20/25
OD_SC: 20/20
METHOD: SNELLEN - LINEAR

## 2024-03-22 ASSESSMENT — CONF VISUAL FIELD
OS_NORMAL: 1
OS_INFERIOR_TEMPORAL_RESTRICTION: 0
OD_INFERIOR_TEMPORAL_RESTRICTION: 0
OS_SUPERIOR_TEMPORAL_RESTRICTION: 0
OS_INFERIOR_NASAL_RESTRICTION: 0
OD_INFERIOR_NASAL_RESTRICTION: 0
OD_SUPERIOR_NASAL_RESTRICTION: 0
OS_SUPERIOR_NASAL_RESTRICTION: 0
OD_NORMAL: 1
OD_SUPERIOR_TEMPORAL_RESTRICTION: 0

## 2024-03-22 ASSESSMENT — PACHYMETRY
OS_CT(UM): 581
OD_CT(UM): 575

## 2024-03-22 ASSESSMENT — REFRACTION_MANIFEST
OS_AXIS: 005
OD_SPHERE: +0.50
OD_ADD: +2.50
OD_CYLINDER: -0.50
OS_SPHERE: +0.75
OS_CYLINDER: -1.00
OD_AXIS: 165
OS_ADD: +2.50

## 2024-03-22 ASSESSMENT — TONOMETRY
OD_IOP_MMHG: 19
OS_IOP_MMHG: 18
IOP_METHOD: GOLDMANN APPLANATION

## 2024-04-02 ENCOUNTER — PHARMACY VISIT (OUTPATIENT)
Dept: PHARMACY | Facility: CLINIC | Age: 69
End: 2024-04-02
Payer: COMMERCIAL

## 2024-04-02 PROCEDURE — RXMED WILLOW AMBULATORY MEDICATION CHARGE

## 2024-04-04 ENCOUNTER — CLINICAL SUPPORT (OUTPATIENT)
Dept: ALLERGY | Facility: CLINIC | Age: 69
End: 2024-04-04
Payer: COMMERCIAL

## 2024-04-04 DIAGNOSIS — Z91.038 ALLERGY TO INSECT VENOM: ICD-10-CM

## 2024-04-04 DIAGNOSIS — J30.2 SEASONAL ALLERGIES: ICD-10-CM

## 2024-04-04 PROCEDURE — 95149 ANTIGEN THERAPY SERVICES: CPT | Performed by: ALLERGY & IMMUNOLOGY

## 2024-04-04 PROCEDURE — 95117 IMMUNOTHERAPY INJECTIONS: CPT | Performed by: ALLERGY & IMMUNOLOGY

## 2024-04-11 ENCOUNTER — CLINICAL SUPPORT (OUTPATIENT)
Dept: ALLERGY | Facility: CLINIC | Age: 69
End: 2024-04-11
Payer: COMMERCIAL

## 2024-04-11 DIAGNOSIS — Z91.038 ALLERGY TO INSECT VENOM: ICD-10-CM

## 2024-04-11 DIAGNOSIS — J30.2 SEASONAL ALLERGIES: ICD-10-CM

## 2024-04-11 PROCEDURE — 95117 IMMUNOTHERAPY INJECTIONS: CPT | Performed by: ALLERGY & IMMUNOLOGY

## 2024-04-11 PROCEDURE — 95149 ANTIGEN THERAPY SERVICES: CPT | Performed by: ALLERGY & IMMUNOLOGY

## 2024-04-30 PROCEDURE — RXMED WILLOW AMBULATORY MEDICATION CHARGE

## 2024-05-01 ENCOUNTER — PHARMACY VISIT (OUTPATIENT)
Dept: PHARMACY | Facility: CLINIC | Age: 69
End: 2024-05-01
Payer: COMMERCIAL

## 2024-05-02 ENCOUNTER — CLINICAL SUPPORT (OUTPATIENT)
Dept: ALLERGY | Facility: CLINIC | Age: 69
End: 2024-05-02
Payer: COMMERCIAL

## 2024-05-02 DIAGNOSIS — Z91.038 ALLERGY TO INSECT VENOM: ICD-10-CM

## 2024-05-02 DIAGNOSIS — J30.2 SEASONAL ALLERGIES: ICD-10-CM

## 2024-05-02 PROCEDURE — 95149 ANTIGEN THERAPY SERVICES: CPT | Performed by: ALLERGY & IMMUNOLOGY

## 2024-05-02 PROCEDURE — 95117 IMMUNOTHERAPY INJECTIONS: CPT | Performed by: ALLERGY & IMMUNOLOGY

## 2024-05-06 DIAGNOSIS — L71.9 ROSACEA: Primary | ICD-10-CM

## 2024-05-06 DIAGNOSIS — H10.13 ALLERGIC CONJUNCTIVITIS OF BOTH EYES: Primary | ICD-10-CM

## 2024-05-06 RX ORDER — AZELASTINE HYDROCHLORIDE 0.5 MG/ML
1 SOLUTION/ DROPS OPHTHALMIC 2 TIMES DAILY
Qty: 6 ML | Refills: 11 | Status: SHIPPED | OUTPATIENT
Start: 2024-05-06

## 2024-05-06 RX ORDER — SULFACETAMIDE SODIUM 100 MG/ML
1 LOTION TOPICAL 2 TIMES DAILY
Qty: 118 ML | Refills: 3 | Status: SHIPPED | OUTPATIENT
Start: 2024-05-06

## 2024-05-06 RX ORDER — AZELASTINE HYDROCHLORIDE 0.5 MG/ML
1 SOLUTION/ DROPS OPHTHALMIC 2 TIMES DAILY
COMMUNITY
Start: 2016-02-01 | End: 2024-05-06 | Stop reason: SDUPTHER

## 2024-05-20 DIAGNOSIS — J30.1 HAY FEVER: ICD-10-CM

## 2024-05-20 DIAGNOSIS — J30.89 PERENNIAL ALLERGIC RHINITIS: ICD-10-CM

## 2024-05-20 DIAGNOSIS — J30.81 ANIMAL DANDER ALLERGY: Primary | ICD-10-CM

## 2024-05-20 PROCEDURE — 95165 ANTIGEN THERAPY SERVICES: CPT | Performed by: ALLERGY & IMMUNOLOGY

## 2024-05-23 ENCOUNTER — APPOINTMENT (OUTPATIENT)
Dept: PRIMARY CARE | Facility: CLINIC | Age: 69
End: 2024-05-23
Payer: COMMERCIAL

## 2024-05-28 DIAGNOSIS — E78.5 HYPERLIPIDEMIA, UNSPECIFIED HYPERLIPIDEMIA TYPE: Primary | ICD-10-CM

## 2024-05-28 RX ORDER — ALIROCUMAB 75 MG/ML
INJECTION, SOLUTION SUBCUTANEOUS
Qty: 6 ML | Refills: 1 | Status: SHIPPED | OUTPATIENT
Start: 2024-05-28 | End: 2025-05-28

## 2024-05-29 ENCOUNTER — CLINICAL SUPPORT (OUTPATIENT)
Dept: ALLERGY | Facility: CLINIC | Age: 69
End: 2024-05-29
Payer: COMMERCIAL

## 2024-05-29 DIAGNOSIS — Z91.038 ALLERGY TO INSECT VENOM: ICD-10-CM

## 2024-05-29 DIAGNOSIS — J30.2 SEASONAL ALLERGIES: ICD-10-CM

## 2024-05-29 PROCEDURE — 95149 ANTIGEN THERAPY SERVICES: CPT | Performed by: ALLERGY & IMMUNOLOGY

## 2024-05-29 PROCEDURE — 95117 IMMUNOTHERAPY INJECTIONS: CPT | Performed by: ALLERGY & IMMUNOLOGY

## 2024-06-04 ENCOUNTER — APPOINTMENT (OUTPATIENT)
Dept: PRIMARY CARE | Facility: CLINIC | Age: 69
End: 2024-06-04
Payer: COMMERCIAL

## 2024-06-05 ENCOUNTER — SPECIALTY PHARMACY (OUTPATIENT)
Dept: PHARMACY | Facility: CLINIC | Age: 69
End: 2024-06-05

## 2024-06-06 ENCOUNTER — APPOINTMENT (OUTPATIENT)
Dept: PRIMARY CARE | Facility: CLINIC | Age: 69
End: 2024-06-06
Payer: COMMERCIAL

## 2024-06-12 ENCOUNTER — APPOINTMENT (OUTPATIENT)
Dept: ALLERGY | Facility: CLINIC | Age: 69
End: 2024-06-12
Payer: COMMERCIAL

## 2024-06-12 DIAGNOSIS — J30.2 SEASONAL ALLERGIES: ICD-10-CM

## 2024-06-12 DIAGNOSIS — Z91.038 ALLERGY TO INSECT VENOM: ICD-10-CM

## 2024-06-12 PROCEDURE — 95149 ANTIGEN THERAPY SERVICES: CPT | Performed by: ALLERGY & IMMUNOLOGY

## 2024-06-12 PROCEDURE — 95117 IMMUNOTHERAPY INJECTIONS: CPT | Performed by: ALLERGY & IMMUNOLOGY

## 2024-06-18 DIAGNOSIS — T63.94XS TOXIC EFFECT OF VENOM, UNDETERMINED INTENT, SEQUELA: Primary | ICD-10-CM

## 2024-06-18 RX ORDER — PREDNISONE 20 MG/1
60 TABLET ORAL DAILY
Qty: 9 TABLET | Refills: 0 | Status: SHIPPED | OUTPATIENT
Start: 2024-06-18 | End: 2024-06-21

## 2024-06-19 ENCOUNTER — APPOINTMENT (OUTPATIENT)
Dept: ALLERGY | Facility: CLINIC | Age: 69
End: 2024-06-19
Payer: COMMERCIAL

## 2024-06-21 DIAGNOSIS — J30.81 ALLERGIC RHINITIS DUE TO CATS: Primary | ICD-10-CM

## 2024-06-24 RX ORDER — DESLORATADINE AND PSEUDOEPHEDRINE SULFATE 2.5; 12 MG/1; MG/1
1 TABLET, EXTENDED RELEASE ORAL EVERY 12 HOURS PRN
Qty: 60 TABLET | Refills: 3 | Status: SHIPPED | OUTPATIENT
Start: 2024-06-24

## 2024-06-26 ENCOUNTER — APPOINTMENT (OUTPATIENT)
Dept: ALLERGY | Facility: CLINIC | Age: 69
End: 2024-06-26
Payer: COMMERCIAL

## 2024-06-26 DIAGNOSIS — J30.2 SEASONAL ALLERGIES: ICD-10-CM

## 2024-06-26 DIAGNOSIS — Z91.038 ALLERGY TO INSECT VENOM: ICD-10-CM

## 2024-06-26 PROBLEM — T63.91XA ALLERGY OR TOXIC REACTION TO VENOM: Status: RESOLVED | Noted: 2023-11-29 | Resolved: 2024-06-26

## 2024-06-26 PROCEDURE — 95149 ANTIGEN THERAPY SERVICES: CPT | Performed by: ALLERGY & IMMUNOLOGY

## 2024-06-26 PROCEDURE — 95117 IMMUNOTHERAPY INJECTIONS: CPT | Performed by: ALLERGY & IMMUNOLOGY

## 2024-07-03 ENCOUNTER — APPOINTMENT (OUTPATIENT)
Dept: ALLERGY | Facility: CLINIC | Age: 69
End: 2024-07-03
Payer: COMMERCIAL

## 2024-07-03 DIAGNOSIS — J30.2 SEASONAL ALLERGIES: ICD-10-CM

## 2024-07-03 DIAGNOSIS — Z91.038 ALLERGY TO INSECT VENOM: ICD-10-CM

## 2024-07-03 PROCEDURE — 95149 ANTIGEN THERAPY SERVICES: CPT | Performed by: ALLERGY & IMMUNOLOGY

## 2024-07-03 PROCEDURE — 95117 IMMUNOTHERAPY INJECTIONS: CPT | Performed by: ALLERGY & IMMUNOLOGY

## 2024-07-09 ENCOUNTER — SPECIALTY PHARMACY (OUTPATIENT)
Dept: PHARMACY | Facility: CLINIC | Age: 69
End: 2024-07-09

## 2024-07-10 ENCOUNTER — APPOINTMENT (OUTPATIENT)
Dept: ALLERGY | Facility: CLINIC | Age: 69
End: 2024-07-10
Payer: COMMERCIAL

## 2024-07-10 DIAGNOSIS — J30.2 SEASONAL ALLERGIES: ICD-10-CM

## 2024-07-10 DIAGNOSIS — Z91.038 ALLERGY TO INSECT VENOM: ICD-10-CM

## 2024-07-10 PROCEDURE — 95149 ANTIGEN THERAPY SERVICES: CPT | Performed by: ALLERGY & IMMUNOLOGY

## 2024-07-10 PROCEDURE — 95117 IMMUNOTHERAPY INJECTIONS: CPT | Performed by: ALLERGY & IMMUNOLOGY

## 2024-07-19 PROCEDURE — RXMED WILLOW AMBULATORY MEDICATION CHARGE

## 2024-07-23 ENCOUNTER — PHARMACY VISIT (OUTPATIENT)
Dept: PHARMACY | Facility: CLINIC | Age: 69
End: 2024-07-23
Payer: COMMERCIAL

## 2024-07-24 ENCOUNTER — APPOINTMENT (OUTPATIENT)
Dept: ALLERGY | Facility: CLINIC | Age: 69
End: 2024-07-24
Payer: COMMERCIAL

## 2024-07-24 DIAGNOSIS — J30.2 SEASONAL ALLERGIES: ICD-10-CM

## 2024-07-24 DIAGNOSIS — Z91.038 ALLERGY TO INSECT VENOM: ICD-10-CM

## 2024-07-24 PROCEDURE — 95117 IMMUNOTHERAPY INJECTIONS: CPT | Performed by: ALLERGY & IMMUNOLOGY

## 2024-07-24 PROCEDURE — 95149 ANTIGEN THERAPY SERVICES: CPT | Performed by: ALLERGY & IMMUNOLOGY

## 2024-08-13 ENCOUNTER — PHARMACY VISIT (OUTPATIENT)
Dept: PHARMACY | Facility: CLINIC | Age: 69
End: 2024-08-13
Payer: COMMERCIAL

## 2024-08-13 PROCEDURE — RXMED WILLOW AMBULATORY MEDICATION CHARGE

## 2024-08-14 ENCOUNTER — CLINICAL SUPPORT (OUTPATIENT)
Dept: ALLERGY | Facility: CLINIC | Age: 69
End: 2024-08-14
Payer: COMMERCIAL

## 2024-08-14 DIAGNOSIS — J30.2 SEASONAL ALLERGIES: ICD-10-CM

## 2024-08-14 DIAGNOSIS — Z91.038 ALLERGY TO INSECT VENOM: ICD-10-CM

## 2024-08-14 PROCEDURE — 95117 IMMUNOTHERAPY INJECTIONS: CPT | Performed by: ALLERGY & IMMUNOLOGY

## 2024-08-14 PROCEDURE — 95149 ANTIGEN THERAPY SERVICES: CPT | Performed by: ALLERGY & IMMUNOLOGY

## 2024-09-10 PROCEDURE — RXMED WILLOW AMBULATORY MEDICATION CHARGE

## 2024-09-11 ENCOUNTER — CLINICAL SUPPORT (OUTPATIENT)
Dept: ALLERGY | Facility: CLINIC | Age: 69
End: 2024-09-11
Payer: COMMERCIAL

## 2024-09-11 DIAGNOSIS — J30.2 SEASONAL ALLERGIES: ICD-10-CM

## 2024-09-11 DIAGNOSIS — Z91.038 ALLERGY TO INSECT VENOM: ICD-10-CM

## 2024-09-11 PROCEDURE — 95115 IMMUNOTHERAPY ONE INJECTION: CPT | Performed by: ALLERGY & IMMUNOLOGY

## 2024-09-11 PROCEDURE — 95149 ANTIGEN THERAPY SERVICES: CPT | Performed by: ALLERGY & IMMUNOLOGY

## 2024-09-12 ENCOUNTER — PHARMACY VISIT (OUTPATIENT)
Dept: PHARMACY | Facility: CLINIC | Age: 69
End: 2024-09-12
Payer: COMMERCIAL

## 2024-09-18 ENCOUNTER — CLINICAL SUPPORT (OUTPATIENT)
Dept: ALLERGY | Facility: CLINIC | Age: 69
End: 2024-09-18
Payer: COMMERCIAL

## 2024-09-18 DIAGNOSIS — J30.2 SEASONAL ALLERGIES: ICD-10-CM

## 2024-09-18 PROCEDURE — 95149 ANTIGEN THERAPY SERVICES: CPT | Performed by: ALLERGY & IMMUNOLOGY

## 2024-09-18 PROCEDURE — 95117 IMMUNOTHERAPY INJECTIONS: CPT | Performed by: ALLERGY & IMMUNOLOGY

## 2024-09-30 DIAGNOSIS — Z13.89 SCREENING FOR MULTIPLE CONDITIONS: Primary | ICD-10-CM

## 2024-09-30 DIAGNOSIS — Z51.81 MEDICATION MONITORING ENCOUNTER: ICD-10-CM

## 2024-10-02 ENCOUNTER — CLINICAL SUPPORT (OUTPATIENT)
Dept: ALLERGY | Facility: CLINIC | Age: 69
End: 2024-10-02
Payer: COMMERCIAL

## 2024-10-02 DIAGNOSIS — Z91.038 ALLERGY TO INSECT VENOM: ICD-10-CM

## 2024-10-02 DIAGNOSIS — J30.2 SEASONAL ALLERGIES: ICD-10-CM

## 2024-10-02 PROCEDURE — 95149 ANTIGEN THERAPY SERVICES: CPT | Performed by: ALLERGY & IMMUNOLOGY

## 2024-10-02 PROCEDURE — 95117 IMMUNOTHERAPY INJECTIONS: CPT | Performed by: ALLERGY & IMMUNOLOGY

## 2024-10-03 ENCOUNTER — LAB (OUTPATIENT)
Dept: LAB | Facility: LAB | Age: 69
End: 2024-10-03
Payer: COMMERCIAL

## 2024-10-03 DIAGNOSIS — M60.9 STATIN-INDUCED MYOSITIS: ICD-10-CM

## 2024-10-03 DIAGNOSIS — T46.6X5A STATIN-INDUCED MYOSITIS: ICD-10-CM

## 2024-10-03 DIAGNOSIS — E78.49 OTHER HYPERLIPIDEMIA: ICD-10-CM

## 2024-10-03 DIAGNOSIS — I10 HYPERTENSION, UNSPECIFIED TYPE: ICD-10-CM

## 2024-10-03 DIAGNOSIS — E04.2 MULTIPLE THYROID NODULES: ICD-10-CM

## 2024-10-03 DIAGNOSIS — Z51.81 MEDICATION MONITORING ENCOUNTER: ICD-10-CM

## 2024-10-03 DIAGNOSIS — E55.9 VITAMIN D DEFICIENCY: ICD-10-CM

## 2024-10-03 DIAGNOSIS — Z13.89 SCREENING FOR MULTIPLE CONDITIONS: ICD-10-CM

## 2024-10-03 LAB
25(OH)D3 SERPL-MCNC: 68 NG/ML (ref 30–100)
ALBUMIN SERPL BCP-MCNC: 4.4 G/DL (ref 3.4–5)
ALP SERPL-CCNC: 65 U/L (ref 33–136)
ALT SERPL W P-5'-P-CCNC: 33 U/L (ref 7–45)
ANION GAP SERPL CALC-SCNC: 13 MMOL/L (ref 10–20)
AST SERPL W P-5'-P-CCNC: 27 U/L (ref 9–39)
BILIRUB SERPL-MCNC: 0.6 MG/DL (ref 0–1.2)
BUN SERPL-MCNC: 18 MG/DL (ref 6–23)
CALCIUM SERPL-MCNC: 9.4 MG/DL (ref 8.6–10.6)
CHLORIDE SERPL-SCNC: 100 MMOL/L (ref 98–107)
CHOLEST SERPL-MCNC: 206 MG/DL (ref 0–199)
CHOLESTEROL/HDL RATIO: 3.8
CO2 SERPL-SCNC: 31 MMOL/L (ref 21–32)
CREAT SERPL-MCNC: 0.57 MG/DL (ref 0.5–1.05)
EGFRCR SERPLBLD CKD-EPI 2021: >90 ML/MIN/1.73M*2
ERYTHROCYTE [DISTWIDTH] IN BLOOD BY AUTOMATED COUNT: 12.2 % (ref 11.5–14.5)
GLUCOSE SERPL-MCNC: 108 MG/DL (ref 74–99)
HCT VFR BLD AUTO: 41.3 % (ref 36–46)
HDLC SERPL-MCNC: 53.9 MG/DL
HGB BLD-MCNC: 14.5 G/DL (ref 12–16)
LDLC SERPL CALC-MCNC: 125 MG/DL
MAGNESIUM SERPL-MCNC: 2.13 MG/DL (ref 1.6–2.4)
MCH RBC QN AUTO: 30.6 PG (ref 26–34)
MCHC RBC AUTO-ENTMCNC: 35.1 G/DL (ref 32–36)
MCV RBC AUTO: 87 FL (ref 80–100)
NON HDL CHOLESTEROL: 152 MG/DL (ref 0–149)
NRBC BLD-RTO: 0 /100 WBCS (ref 0–0)
PLATELET # BLD AUTO: 258 X10*3/UL (ref 150–450)
POTASSIUM SERPL-SCNC: 3.9 MMOL/L (ref 3.5–5.3)
PROT SERPL-MCNC: 7 G/DL (ref 6.4–8.2)
RBC # BLD AUTO: 4.74 X10*6/UL (ref 4–5.2)
SODIUM SERPL-SCNC: 140 MMOL/L (ref 136–145)
TRIGL SERPL-MCNC: 134 MG/DL (ref 0–149)
TSH SERPL-ACNC: 2.4 MIU/L (ref 0.44–3.98)
VIT B12 SERPL-MCNC: 562 PG/ML (ref 211–911)
VLDL: 27 MG/DL (ref 0–40)
WBC # BLD AUTO: 6.8 X10*3/UL (ref 4.4–11.3)

## 2024-10-03 PROCEDURE — 84443 ASSAY THYROID STIM HORMONE: CPT

## 2024-10-03 PROCEDURE — 82306 VITAMIN D 25 HYDROXY: CPT

## 2024-10-03 PROCEDURE — 80061 LIPID PANEL: CPT

## 2024-10-03 PROCEDURE — 80053 COMPREHEN METABOLIC PANEL: CPT

## 2024-10-03 PROCEDURE — 82607 VITAMIN B-12: CPT

## 2024-10-03 PROCEDURE — 36415 COLL VENOUS BLD VENIPUNCTURE: CPT

## 2024-10-03 PROCEDURE — 83735 ASSAY OF MAGNESIUM: CPT

## 2024-10-03 PROCEDURE — 85027 COMPLETE CBC AUTOMATED: CPT

## 2024-10-04 DIAGNOSIS — Z12.11 COLON CANCER SCREENING: ICD-10-CM

## 2024-10-06 RX ORDER — SODIUM, POTASSIUM,MAG SULFATES 17.5-3.13G
1 SOLUTION, RECONSTITUTED, ORAL ORAL 2 TIMES DAILY
Qty: 1 EACH | Refills: 0 | Status: SHIPPED | OUTPATIENT
Start: 2024-10-06 | End: 2024-10-07

## 2024-10-08 ENCOUNTER — TELEPHONE (OUTPATIENT)
Dept: CARDIOLOGY | Facility: CLINIC | Age: 69
End: 2024-10-08
Payer: COMMERCIAL

## 2024-10-08 PROCEDURE — RXMED WILLOW AMBULATORY MEDICATION CHARGE

## 2024-10-08 NOTE — TELEPHONE ENCOUNTER
----- Message from Sravan Sandoval sent at 10/8/2024  4:00 PM EDT -----  Her know labs generally look good LDL cholesterol has drifted a bit higher please redouble efforts with regard to diet otherwise no other med changes for now

## 2024-10-09 ENCOUNTER — TELEPHONE (OUTPATIENT)
Dept: CARDIOLOGY | Facility: CLINIC | Age: 69
End: 2024-10-09
Payer: COMMERCIAL

## 2024-10-09 ENCOUNTER — PHARMACY VISIT (OUTPATIENT)
Dept: PHARMACY | Facility: CLINIC | Age: 69
End: 2024-10-09
Payer: COMMERCIAL

## 2024-10-09 NOTE — TELEPHONE ENCOUNTER
Called and left voicemail for patient to call triage line with any questions about the my chart message from Dr. Sandoval which was seen.

## 2024-10-10 ENCOUNTER — APPOINTMENT (OUTPATIENT)
Dept: RADIOLOGY | Facility: CLINIC | Age: 69
End: 2024-10-10
Payer: COMMERCIAL

## 2024-10-10 ENCOUNTER — TELEPHONE (OUTPATIENT)
Dept: CARDIOLOGY | Facility: CLINIC | Age: 69
End: 2024-10-10
Payer: COMMERCIAL

## 2024-10-10 NOTE — TELEPHONE ENCOUNTER
Tried calling patient with results of lab work. Also tried calling 10/8 and 10/9. Per chart message from Dr. Sandoval read by patient on my chart.

## 2024-10-11 ENCOUNTER — HOSPITAL ENCOUNTER (OUTPATIENT)
Dept: RADIOLOGY | Facility: CLINIC | Age: 69
Discharge: HOME | End: 2024-10-11
Payer: COMMERCIAL

## 2024-10-11 DIAGNOSIS — M76.891 OTHER SPECIFIED ENTHESOPATHIES OF RIGHT LOWER LIMB, EXCLUDING FOOT: ICD-10-CM

## 2024-10-11 PROCEDURE — 73502 X-RAY EXAM HIP UNI 2-3 VIEWS: CPT | Mod: RT

## 2024-10-16 ENCOUNTER — CLINICAL SUPPORT (OUTPATIENT)
Dept: ALLERGY | Facility: CLINIC | Age: 69
End: 2024-10-16
Payer: COMMERCIAL

## 2024-10-16 ENCOUNTER — APPOINTMENT (OUTPATIENT)
Dept: ALLERGY | Facility: CLINIC | Age: 69
End: 2024-10-16
Payer: COMMERCIAL

## 2024-10-16 DIAGNOSIS — J30.2 SEASONAL ALLERGIES: ICD-10-CM

## 2024-10-16 DIAGNOSIS — Z91.038 ALLERGY TO INSECT VENOM: ICD-10-CM

## 2024-10-16 PROCEDURE — 95149 ANTIGEN THERAPY SERVICES: CPT | Performed by: ALLERGY & IMMUNOLOGY

## 2024-10-16 PROCEDURE — 95117 IMMUNOTHERAPY INJECTIONS: CPT | Performed by: ALLERGY & IMMUNOLOGY

## 2024-10-23 ENCOUNTER — APPOINTMENT (OUTPATIENT)
Dept: ALLERGY | Facility: CLINIC | Age: 69
End: 2024-10-23
Payer: COMMERCIAL

## 2024-10-23 DIAGNOSIS — J30.2 SEASONAL ALLERGIES: ICD-10-CM

## 2024-10-23 DIAGNOSIS — Z91.038 ALLERGY TO INSECT VENOM: ICD-10-CM

## 2024-10-23 PROCEDURE — 95149 ANTIGEN THERAPY SERVICES: CPT | Performed by: ALLERGY & IMMUNOLOGY

## 2024-10-23 PROCEDURE — 95117 IMMUNOTHERAPY INJECTIONS: CPT | Performed by: ALLERGY & IMMUNOLOGY

## 2024-10-30 ENCOUNTER — APPOINTMENT (OUTPATIENT)
Dept: ALLERGY | Facility: CLINIC | Age: 69
End: 2024-10-30
Payer: COMMERCIAL

## 2024-10-30 DIAGNOSIS — J30.2 SEASONAL ALLERGIES: ICD-10-CM

## 2024-10-30 DIAGNOSIS — Z91.038 ALLERGY TO INSECT VENOM: ICD-10-CM

## 2024-10-30 PROBLEM — M89.9 DISORDER OF BONE: Status: ACTIVE | Noted: 2024-10-30

## 2024-10-30 PROBLEM — Z86.39 HISTORY OF METABOLIC DISORDER: Status: ACTIVE | Noted: 2024-10-30

## 2024-10-30 PROBLEM — H35.411 LATTICE DEGENERATION OF RETINA, RIGHT: Status: ACTIVE | Noted: 2024-10-30

## 2024-10-30 PROBLEM — D48.5 NEOPLASM OF UNCERTAIN BEHAVIOR OF SKIN: Status: RESOLVED | Noted: 2019-02-08 | Resolved: 2024-10-30

## 2024-10-30 PROBLEM — R60.0 LEG EDEMA: Status: ACTIVE | Noted: 2024-10-30

## 2024-10-30 PROBLEM — H81.10 BENIGN PAROXYSMAL POSITIONAL VERTIGO: Status: ACTIVE | Noted: 2024-10-30

## 2024-10-30 PROBLEM — N95.2 ATROPHIC VULVOVAGINITIS: Status: ACTIVE | Noted: 2024-10-30

## 2024-10-30 PROBLEM — Z86.79 HISTORY OF HYPERTENSION: Status: ACTIVE | Noted: 2024-10-30

## 2024-10-30 PROBLEM — Z86.39 HISTORY OF THYROID DISORDER: Status: ACTIVE | Noted: 2024-10-30

## 2024-10-30 PROBLEM — K21.9 GASTROESOPHAGEAL REFLUX DISEASE: Status: ACTIVE | Noted: 2019-05-02

## 2024-10-30 PROBLEM — R51.9 GENERALIZED HEADACHES: Status: ACTIVE | Noted: 2024-10-30

## 2024-10-30 PROBLEM — J30.81 ALLERGIC RHINITIS DUE TO ANIMAL HAIR AND DANDER: Status: ACTIVE | Noted: 2024-10-30

## 2024-10-30 PROBLEM — D58.2 ELEVATED HEMOGLOBIN (CMS-HCC): Status: ACTIVE | Noted: 2024-10-30

## 2024-10-30 PROBLEM — M54.50 LUMBAR PAIN: Status: ACTIVE | Noted: 2024-10-30

## 2024-10-30 PROBLEM — H02.889 MEIBOMIAN GLAND DYSFUNCTION: Status: RESOLVED | Noted: 2023-11-29 | Resolved: 2024-10-30

## 2024-10-30 PROBLEM — J30.9 ALLERGIC SINUSITIS: Status: ACTIVE | Noted: 2024-10-30

## 2024-10-30 PROBLEM — J20.9 ACUTE BRONCHITIS WITH BRONCHOSPASM: Status: ACTIVE | Noted: 2024-10-30

## 2024-10-30 PROCEDURE — 95117 IMMUNOTHERAPY INJECTIONS: CPT | Performed by: ALLERGY & IMMUNOLOGY

## 2024-10-30 PROCEDURE — 95149 ANTIGEN THERAPY SERVICES: CPT | Performed by: ALLERGY & IMMUNOLOGY

## 2024-11-06 ENCOUNTER — APPOINTMENT (OUTPATIENT)
Dept: ALLERGY | Facility: CLINIC | Age: 69
End: 2024-11-06
Payer: COMMERCIAL

## 2024-11-08 PROCEDURE — RXMED WILLOW AMBULATORY MEDICATION CHARGE

## 2024-11-09 ENCOUNTER — PHARMACY VISIT (OUTPATIENT)
Dept: PHARMACY | Facility: CLINIC | Age: 69
End: 2024-11-09
Payer: COMMERCIAL

## 2024-11-12 ENCOUNTER — ANESTHESIA EVENT (OUTPATIENT)
Dept: GASTROENTEROLOGY | Facility: EXTERNAL LOCATION | Age: 69
End: 2024-11-12

## 2024-11-13 ENCOUNTER — APPOINTMENT (OUTPATIENT)
Dept: ALLERGY | Facility: CLINIC | Age: 69
End: 2024-11-13
Payer: COMMERCIAL

## 2024-11-13 DIAGNOSIS — J30.2 SEASONAL ALLERGIES: ICD-10-CM

## 2024-11-13 DIAGNOSIS — Z91.038 ALLERGY TO INSECT VENOM: ICD-10-CM

## 2024-11-13 PROCEDURE — 95149 ANTIGEN THERAPY SERVICES: CPT | Performed by: ALLERGY & IMMUNOLOGY

## 2024-11-13 PROCEDURE — 95117 IMMUNOTHERAPY INJECTIONS: CPT | Performed by: ALLERGY & IMMUNOLOGY

## 2024-11-20 ENCOUNTER — APPOINTMENT (OUTPATIENT)
Dept: ALLERGY | Facility: CLINIC | Age: 69
End: 2024-11-20
Payer: COMMERCIAL

## 2024-11-21 ENCOUNTER — APPOINTMENT (OUTPATIENT)
Dept: GASTROENTEROLOGY | Facility: EXTERNAL LOCATION | Age: 69
End: 2024-11-21
Payer: COMMERCIAL

## 2024-11-21 ENCOUNTER — ANESTHESIA (OUTPATIENT)
Dept: GASTROENTEROLOGY | Facility: EXTERNAL LOCATION | Age: 69
End: 2024-11-21

## 2024-11-21 VITALS
WEIGHT: 202 LBS | SYSTOLIC BLOOD PRESSURE: 132 MMHG | RESPIRATION RATE: 16 BRPM | HEIGHT: 64 IN | BODY MASS INDEX: 34.49 KG/M2 | HEART RATE: 69 BPM | TEMPERATURE: 98.2 F | DIASTOLIC BLOOD PRESSURE: 83 MMHG | OXYGEN SATURATION: 99 %

## 2024-11-21 DIAGNOSIS — Z12.11 COLON CANCER SCREENING: ICD-10-CM

## 2024-11-21 PROCEDURE — 45380 COLONOSCOPY AND BIOPSY: CPT | Performed by: INTERNAL MEDICINE

## 2024-11-21 RX ORDER — PROPOFOL 10 MG/ML
INJECTION, EMULSION INTRAVENOUS AS NEEDED
Status: DISCONTINUED | OUTPATIENT
Start: 2024-11-21 | End: 2024-11-21

## 2024-11-21 RX ORDER — ONDANSETRON HYDROCHLORIDE 2 MG/ML
4 INJECTION, SOLUTION INTRAVENOUS ONCE AS NEEDED
Status: DISCONTINUED | OUTPATIENT
Start: 2024-11-21 | End: 2024-11-22 | Stop reason: HOSPADM

## 2024-11-21 RX ORDER — LIDOCAINE HYDROCHLORIDE 20 MG/ML
INJECTION, SOLUTION INFILTRATION; PERINEURAL AS NEEDED
Status: DISCONTINUED | OUTPATIENT
Start: 2024-11-21 | End: 2024-11-21

## 2024-11-21 SDOH — HEALTH STABILITY: MENTAL HEALTH: CURRENT SMOKER: 0

## 2024-11-21 ASSESSMENT — PAIN - FUNCTIONAL ASSESSMENT
PAIN_FUNCTIONAL_ASSESSMENT: 0-10
PAIN_FUNCTIONAL_ASSESSMENT: 0-10

## 2024-11-21 ASSESSMENT — COLUMBIA-SUICIDE SEVERITY RATING SCALE - C-SSRS
6. HAVE YOU EVER DONE ANYTHING, STARTED TO DO ANYTHING, OR PREPARED TO DO ANYTHING TO END YOUR LIFE?: NO
1. IN THE PAST MONTH, HAVE YOU WISHED YOU WERE DEAD OR WISHED YOU COULD GO TO SLEEP AND NOT WAKE UP?: NO
2. HAVE YOU ACTUALLY HAD ANY THOUGHTS OF KILLING YOURSELF?: NO

## 2024-11-21 ASSESSMENT — PAIN SCALES - GENERAL
PAINLEVEL_OUTOF10: 0 - NO PAIN
PAINLEVEL_OUTOF10: 0 - NO PAIN
PAINLEVEL_OUTOF10: 1
PAINLEVEL_OUTOF10: 0 - NO PAIN
PAIN_LEVEL: 0

## 2024-11-21 NOTE — ANESTHESIA POSTPROCEDURE EVALUATION
Patient: Nicole Schwartz MD    Procedure Summary       Date: 11/21/24 Room / Location: [facility]    Anesthesia Start: 0958 Anesthesia Stop: 1024    Procedure: COLONOSCOPY Diagnosis: Colon cancer screening    Scheduled Providers: Sheridan BUTLER MD Responsible Provider: CLIVE Hwang    Anesthesia Type: MAC ASA Status: 3            Anesthesia Type: MAC    Vitals Value Taken Time   /83 11/21/24 1045   Temp 36.8 °C (98.2 °F) 11/21/24 1024   Pulse 69 11/21/24 1045   Resp 16 11/21/24 1045   SpO2 99 % 11/21/24 1045       Anesthesia Post Evaluation    Patient location during evaluation: bedside  Patient participation: complete - patient participated  Level of consciousness: awake and alert  Pain score: 0  Pain management: adequate  Airway patency: patent  Cardiovascular status: acceptable  Respiratory status: acceptable  Hydration status: acceptable  Postoperative Nausea and Vomiting: none        No notable events documented.

## 2024-11-21 NOTE — H&P
Outpatient Hospital Procedure    Patient Profile-Procedures  Initial Info  Patient Demographics  Name Nicole Schwartz MD  Date of Birth 1955  MRN [id number]  Address   [address] [facility] [id number] [facility] [address]    Primary Phone Number [phone]  Secondary Phone Number    PCP Irma Hayward    Procedures   Colonoscopy      Indication:  surveillance    Primary contact name and number   Extended Emergency Contact Information  Primary Emergency Contact: MelquiadesWalt lima  Address: [address] [place] [address] [facility]  Home Phone: [phone]  Mobile Phone: [phone]  Relation: Spouse  Secondary Emergency Contact: RenojarvisBel hairne  Address: [facility]  Home Phone: [phone]  Mobile Phone: [phone]  Relation: Sister    General Health  Weight   Vitals:    11/21/24 0927   Weight: 91.6 kg (202 lb)     BMI Body mass index is 34.67 kg/m².    Allergies  Allergies   Allergen Reactions    Lisinopril Cough    Prochlorperazine Hives    Ramipril Cough    Statins-Hmg-Coa Reductase Inhibitors Myalgia    Cephalexin Hives and Rash    Povidone-Iodine Rash       Past Medical History   Past Medical History:   Diagnosis Date    Abnormal levels of other serum enzymes 12/24/2019    Elevated CPK    Acute frontal sinusitis, unspecified 02/02/2016    Acute frontal sinusitis    Acute upper respiratory infection, unspecified 06/10/2018    URTI (acute upper respiratory infection)    Acute vaginitis 09/04/2015    Vulvovaginitis, estrogen deficient    Allergic rhinitis due to pollen 04/26/2016    Allergic rhinitis due to pollen    Allergy status to unspecified drugs, medicaments and biological substances 08/18/2016    History of allergic reaction    Anaphylactic shock, unspecified, initial encounter 08/26/2015    Anaphylaxis    Asymptomatic menopausal state 09/30/2016    Menopause    Cellulitis of unspecified  part of limb 07/08/2019    Cellulitis of arm    Combined forms of age-related cataract, right eye 03/09/2022    Combined form of age-related cataract, right eye    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    Facial asymmetry    Cough, unspecified 12/22/2014    Cough    Dietary counseling and surveillance 05/26/2018    Encounter for weight loss counseling    Elevated blood-pressure reading, without diagnosis of hypertension 04/21/2017    Borderline hypertension    Encounter for gynecological examination (general) (routine) without abnormal findings 09/30/2016    Encounter for routine gynecological examination    Encounter for gynecological examination (general) (routine) without abnormal findings 09/04/2015    Encounter for routine pelvic examination    Encounter for other screening for malignant neoplasm of breast 12/19/2019    Breast cancer screening    Encounter for screening for malignant neoplasm of colon 12/07/2018    Screen for colon cancer    Encounter for screening for osteoporosis 10/01/2020    Osteoporosis screening    Encounter for screening for respiratory tuberculosis 03/22/2016    Tuberculosis screening    Encounter for screening mammogram for malignant neoplasm of breast 10/27/2016    Visit for screening mammogram    Encounter for screening mammogram for malignant neoplasm of breast 09/04/2015    Visit for screening mammogram    Encounter for therapeutic drug level monitoring 12/17/2019    Medication monitoring encounter    Hypertension     Lichen sclerosus et atrophicus 04/21/2017    Lichen sclerosus et atrophicus    Localized swelling, mass and lump, head 01/10/2020    Localized swelling, mass and lump, head    Nontoxic single thyroid nodule 03/21/2019    Solitary thyroid nodule    Other allergy status, other than to drugs and biological substances     History of environmental allergies    Other hemoglobinopathies (CMS-HCC) 07/29/2019    Elevated hemoglobin    Other specified disorders of bone,  unspecified site 02/22/2018    Bony prominence    Other specified personal risk factors, not elsewhere classified 09/04/2015    At risk for osteopenia    Other specified soft tissue disorders 11/30/2016    Calf swelling    Pain in left hand 03/04/2021    Pain of left hand    Personal history of other diseases of the circulatory system     History of hypertension    Personal history of other diseases of the nervous system and sense organs 06/11/2021    History of conjunctivitis    Personal history of other diseases of the respiratory system 04/01/2020    History of bronchitis    Personal history of other diseases of the respiratory system 12/31/2018    History of acute bronchitis with bronchospasm    Personal history of other drug therapy 10/09/2019    History of influenza vaccination    Personal history of other drug therapy 03/30/2022    History of influenza vaccination    Personal history of other endocrine, nutritional and metabolic disease     History of high cholesterol    Personal history of other endocrine, nutritional and metabolic disease     History of thyroid disorder    Personal history of other medical treatment 10/11/2017    History of screening mammography    Personal history of other specified conditions 04/14/2022    History of epistaxis    Personal history of other specified conditions     History of snoring    Personal history of other specified conditions 04/21/2017    History of edema    Polyp of cervix uteri 09/04/2015    Endocervical polyp    Postnasal drip 02/28/2022    Post-nasal drainage    Preglaucoma, unspecified, unspecified eye 04/21/2017    Borderline glaucoma (glaucoma suspect)    Rash and other nonspecific skin eruption 10/12/2015    Rash    Sprain of unspecified site of right knee, initial encounter 08/26/2014    Sprain of right knee    Toxic effect of venom of hornets, accidental (unintentional), initial encounter 05/26/2016    Sting from hornet, wasp, or bee    Unspecified  disorder of refraction 04/21/2017    Refraction error    Unspecified injury of unspecified lower leg, initial encounter 03/21/2019    Knee injury    Unspecified injury of unspecified wrist, hand and finger(s), initial encounter 04/21/2017    Finger injury       Provider assessment  Diagnosis  Medication Reviewed - yes  Prior to Admission medications    Medication Sig Start Date End Date Taking? Authorizing Provider   alirocumab (Praluent Pen) 75 mg/mL pen injector INJECT 75MG (1 PEN) UNDER THE SKIN ONCE EVERY 2 WEEKS AS DIRECTED 5/28/24 5/28/25 Yes Susanne Oconnell MD   cholecalciferol (Vitamin D-3) 5,000 Units tablet Take 1 tablet (5,000 Units) by mouth once daily. 1/9/14  Yes Historical Provider, MD   Clarinex-D 12 HOUR 2.5-120 mg 12 hr tablet TAKE ONE TABLET BY MOUTH EVERY 12 HOURS AS NEEDED 6/24/24  Yes Malena Tamez MD   doxycycline (Periostat) 20 mg tablet Take 1 tablet (20 mg) by mouth once daily. 11/9/23  Yes Historical Provider, MD   estradiol (Estrace) 0.01 % (0.1 mg/gram) vaginal cream Insert into the vagina. 9/4/15  Yes Historical Provider, MD   folic acid/vit B complex and C (B complex-vitamin C-folic acid) 400 mcg tablet extended release Take 1 tablet by mouth once daily.   Yes Historical Provider, MD   hydroCHLOROthiazide (HYDRODiuril) 25 mg tablet Take 1 tablet (25 mg) by mouth once daily. 11/30/23 11/29/24 Yes Irma Hayward MD   metFORMIN (Glucophage) 500 mg tablet Take 2 tablets (1,000 mg) by mouth once daily. 11/30/23 11/29/24 Yes Irma Hayward MD   olmesartan (Benicar) 5 mg tablet Take 1 tablet (5 mg) by mouth once daily. 11/30/23 11/29/24 Yes Irma Hayward MD   sulfacetamide suspension (Klaron) 10 % suspension topical Apply 1 Application topically 2 times a day. 5/6/24  Yes Ashli Franz MD   triamcinolone (Kenalog) 0.1 % cream Apply to affected area 1-2 times daily as needed. Avoid face and groin. 11/30/23 11/29/24 Yes Irma Hayward MD   azelastine (Optivar) 0.05 %  ophthalmic solution Administer 1 drop into both eyes 2 times a day. PRN itching/allergies 5/6/24   Ara Diaz MD   clindamycin (Cleocin T) 1 % gel Apply topically 2 times a day. 6/24/20   Historical Provider, MD   EPINEPHrine (Auvi-Q) 0.3 mg/0.3 mL injection syringe inject intramuscularly as directed for anaphylaxis 3/4/24   Malena Tamez MD       This is my H&P    Physical Exam  Physical Exam  Constitutional:       Comments: Awake   HENT:      Head: Normocephalic.   Cardiovascular:      Rate and Rhythm: Normal rate and regular rhythm.   Pulmonary:      Effort: Pulmonary effort is normal.      Breath sounds: Normal breath sounds.   Abdominal:      General: Bowel sounds are normal.      Palpations: Abdomen is soft.   Neurological:      Mental Status: She is alert.   Psychiatric:         Mood and Affect: Mood normal.           Oropharyngeal Classification II (hard and soft palate, upper portion of tonsils and uvula visible)  ASA PS Classification 2  Sedation Plan Deep  Procedure Plan - pre-procedural (re)assesment completed by physician:  discharge/transfer patient when discharge criteria met    Sheridan Moore MD  11/21/2024 9:56 AM

## 2024-11-21 NOTE — ANESTHESIA PREPROCEDURE EVALUATION
Patient: Nicole Schwartz MD    Procedure Information       Date/Time: 11/21/24 0930    Scheduled providers: Sheridan BUTLER MD    Procedure: COLONOSCOPY    Location: [facility]            Relevant Problems   Anesthesia (within normal limits)      Cardiac   (+) Familial hypercholesterolemia   (+) Hyperlipidemia   (+) Hypertension      Pulmonary (within normal limits)      Neuro (within normal limits)      GI   (+) Gastroesophageal reflux disease   (+) Irritable bowel      /Renal (within normal limits)      Liver (within normal limits)      Endocrine   (+) Multiple thyroid nodules      Hematology (within normal limits)      HEENT   (+) Allergic sinusitis   (+) Seasonal allergies      Skin   (+) Rash       Clinical information reviewed:   Tobacco  Allergies  Meds   Med Hx  Surg Hx  OB Status  Fam Hx  Soc   Hx        NPO Detail:  NPO/Void Status  Date of Last Liquid: 11/21/24  Time of Last Liquid: 0400  Date of Last Solid: 11/19/24  Last Intake Type: Clear fluids         Physical Exam    Airway  Mallampati: II  TM distance: >3 FB  Neck ROM: full     Cardiovascular   Rhythm: regular     Dental    Pulmonary   Breath sounds clear to auscultation     Abdominal            Anesthesia Plan    History of general anesthesia?: yes  History of complications of general anesthesia?: no    ASA 3     MAC     The patient is not a current smoker.    intravenous induction   Anesthetic plan and risks discussed with patient.

## 2024-11-21 NOTE — DISCHARGE INSTRUCTIONS
Patient Instructions Post Endoscopy Procedure      The anesthetics, sedatives or narcotics which were given to you today will be acting in your body for the next 24 hours, so you might feel a little sleepy or groggy.  This feeling should slowly wear off. Carefully read and follow the instructions.     You received sedation today:  - Do not drive or operate any machinery or power tools of any kind.   - No alcoholic beverages today, not even beer or wine.  - Do not make any important decisions or sign any legal documents.  - No over the counter medications that contain alcohol or that may cause drowsiness.    While it is common to experience mild to moderate abdominal distention, gas, or belching after your procedure, if any of these symptoms occur following discharge from the GI Lab or within one week of having your procedure, call the [facility] to be advised whether a visit to your nearest Urgent Care or Emergency Department is indicated.  Take this paper with you if you go.   - If you develop an allergic reaction to the medications that were given during your procedure such as difficulty breathing, rash, hives, severe nausea, vomiting or lightheadedness.  - If you experience chest pain, shortness of breath, severe abdominal pain, fevers and chills.  -If you develop signs and symptoms of bleeding such as blood in your spit, if your stools turn black, tarry, or bloody  - If you have not urinated within 8 hours following your procedure.  - If your IV site becomes painful, red, inflamed, or looks infected.      Your physician recommends the additional following instructions:    -You have a contact number available for emergencies. The signs and symptoms of potential delayed complications were discussed with you. You may return to normal activities tomorrow.  -Resume your previous diet or other if specified.  -Continue your present medications.   -We are waiting for your pathology results, if  applicable. The results will be available in "[employer]". I will send you a message with any recommendations.  -The findings and recommendations have been discussed with you and/or family.  -Please see Medication Reconciliation Form for new medication/medications prescribed.     If you experience any problems or have any questions following discharge from the GI Lab, please call: [phone] from 7 am- 4:30 pm.  In the event of an emergency please go to the closest Emergency Department or call Dr. Moore at [phone]

## 2024-11-27 ENCOUNTER — APPOINTMENT (OUTPATIENT)
Dept: ALLERGY | Facility: CLINIC | Age: 69
End: 2024-11-27
Payer: COMMERCIAL

## 2024-12-02 PROCEDURE — RXMED WILLOW AMBULATORY MEDICATION CHARGE

## 2024-12-03 LAB
LABORATORY COMMENT REPORT: NORMAL
PATH REPORT.FINAL DX SPEC: NORMAL
PATH REPORT.GROSS SPEC: NORMAL
PATH REPORT.TOTAL CANCER: NORMAL

## 2024-12-04 ENCOUNTER — APPOINTMENT (OUTPATIENT)
Dept: ALLERGY | Facility: CLINIC | Age: 69
End: 2024-12-04
Payer: COMMERCIAL

## 2024-12-04 ENCOUNTER — PHARMACY VISIT (OUTPATIENT)
Dept: PHARMACY | Facility: CLINIC | Age: 69
End: 2024-12-04
Payer: COMMERCIAL

## 2024-12-04 DIAGNOSIS — Z91.038 ALLERGY TO INSECT VENOM: ICD-10-CM

## 2024-12-04 DIAGNOSIS — J30.2 SEASONAL ALLERGIES: ICD-10-CM

## 2024-12-04 PROCEDURE — 95149 ANTIGEN THERAPY SERVICES: CPT | Performed by: ALLERGY & IMMUNOLOGY

## 2024-12-04 PROCEDURE — 95117 IMMUNOTHERAPY INJECTIONS: CPT | Performed by: ALLERGY & IMMUNOLOGY

## 2024-12-11 ENCOUNTER — APPOINTMENT (OUTPATIENT)
Dept: ALLERGY | Facility: CLINIC | Age: 69
End: 2024-12-11
Payer: COMMERCIAL

## 2024-12-11 DIAGNOSIS — Z91.038 ALLERGY TO INSECT VENOM: ICD-10-CM

## 2024-12-11 DIAGNOSIS — J30.2 SEASONAL ALLERGIES: ICD-10-CM

## 2024-12-11 PROCEDURE — 95149 ANTIGEN THERAPY SERVICES: CPT | Performed by: ALLERGY & IMMUNOLOGY

## 2024-12-11 PROCEDURE — 95117 IMMUNOTHERAPY INJECTIONS: CPT | Performed by: ALLERGY & IMMUNOLOGY

## 2024-12-11 ASSESSMENT — PROMIS GLOBAL HEALTH SCALE
RATE_QUALITY_OF_LIFE: VERY GOOD
RATE_AVERAGE_PAIN: 0
RATE_MENTAL_HEALTH: EXCELLENT
CARRYOUT_SOCIAL_ACTIVITIES: EXCELLENT
EMOTIONAL_PROBLEMS: NEVER
RATE_GENERAL_HEALTH: VERY GOOD
RATE_SOCIAL_SATISFACTION: EXCELLENT
RATE_PHYSICAL_HEALTH: VERY GOOD
CARRYOUT_PHYSICAL_ACTIVITIES: COMPLETELY

## 2024-12-12 ENCOUNTER — APPOINTMENT (OUTPATIENT)
Dept: PRIMARY CARE | Facility: CLINIC | Age: 69
End: 2024-12-12
Payer: COMMERCIAL

## 2024-12-12 VITALS
HEIGHT: 64 IN | WEIGHT: 209.2 LBS | DIASTOLIC BLOOD PRESSURE: 74 MMHG | BODY MASS INDEX: 35.71 KG/M2 | HEART RATE: 85 BPM | TEMPERATURE: 97.9 F | SYSTOLIC BLOOD PRESSURE: 112 MMHG | OXYGEN SATURATION: 98 %

## 2024-12-12 DIAGNOSIS — L71.9 ROSACEA: ICD-10-CM

## 2024-12-12 DIAGNOSIS — Z00.00 ANNUAL PHYSICAL EXAM: ICD-10-CM

## 2024-12-12 DIAGNOSIS — Q67.0 FACIAL ASYMMETRY: ICD-10-CM

## 2024-12-12 DIAGNOSIS — E66.811 OBESITY (BMI 30.0-34.9): ICD-10-CM

## 2024-12-12 DIAGNOSIS — R60.0 LEG EDEMA: ICD-10-CM

## 2024-12-12 DIAGNOSIS — E78.5 HYPERLIPIDEMIA, UNSPECIFIED HYPERLIPIDEMIA TYPE: ICD-10-CM

## 2024-12-12 DIAGNOSIS — Z01.10 ENCOUNTER FOR HEARING EXAMINATION, UNSPECIFIED WHETHER ABNORMAL FINDINGS: Primary | ICD-10-CM

## 2024-12-12 DIAGNOSIS — I10 HYPERTENSION, UNSPECIFIED TYPE: ICD-10-CM

## 2024-12-12 PROCEDURE — 1126F AMNT PAIN NOTED NONE PRSNT: CPT | Performed by: INTERNAL MEDICINE

## 2024-12-12 PROCEDURE — 3008F BODY MASS INDEX DOCD: CPT | Performed by: INTERNAL MEDICINE

## 2024-12-12 PROCEDURE — 90471 IMMUNIZATION ADMIN: CPT | Performed by: INTERNAL MEDICINE

## 2024-12-12 PROCEDURE — 99397 PER PM REEVAL EST PAT 65+ YR: CPT | Performed by: INTERNAL MEDICINE

## 2024-12-12 PROCEDURE — 3074F SYST BP LT 130 MM HG: CPT | Performed by: INTERNAL MEDICINE

## 2024-12-12 PROCEDURE — 3078F DIAST BP <80 MM HG: CPT | Performed by: INTERNAL MEDICINE

## 2024-12-12 PROCEDURE — 90677 PCV20 VACCINE IM: CPT | Performed by: INTERNAL MEDICINE

## 2024-12-12 PROCEDURE — 1159F MED LIST DOCD IN RCRD: CPT | Performed by: INTERNAL MEDICINE

## 2024-12-12 PROCEDURE — RXMED WILLOW AMBULATORY MEDICATION CHARGE

## 2024-12-12 RX ORDER — METFORMIN HYDROCHLORIDE 500 MG/1
1000 TABLET ORAL DAILY
Qty: 180 TABLET | Refills: 3 | Status: SHIPPED | OUTPATIENT
Start: 2024-12-12 | End: 2025-12-12

## 2024-12-12 RX ORDER — HYDROCHLOROTHIAZIDE 25 MG/1
25 TABLET ORAL DAILY
Qty: 90 TABLET | Refills: 3 | Status: SHIPPED | OUTPATIENT
Start: 2024-12-12 | End: 2025-12-12

## 2024-12-12 RX ORDER — OLMESARTAN MEDOXOMIL 5 MG/1
5 TABLET ORAL DAILY
Qty: 90 TABLET | Refills: 3 | Status: SHIPPED | OUTPATIENT
Start: 2024-12-12 | End: 2025-12-12

## 2024-12-12 RX ORDER — SULFACETAMIDE SODIUM 100 MG/ML
1 LOTION TOPICAL 2 TIMES DAILY
Qty: 118 ML | Refills: 3 | Status: SHIPPED | OUTPATIENT
Start: 2024-12-12

## 2024-12-12 ASSESSMENT — PATIENT HEALTH QUESTIONNAIRE - PHQ9
1. LITTLE INTEREST OR PLEASURE IN DOING THINGS: NOT AT ALL
2. FEELING DOWN, DEPRESSED OR HOPELESS: NOT AT ALL
SUM OF ALL RESPONSES TO PHQ9 QUESTIONS 1 AND 2: 0

## 2024-12-12 ASSESSMENT — PAIN SCALES - GENERAL: PAINLEVEL_OUTOF10: 0-NO PAIN

## 2024-12-12 ASSESSMENT — ENCOUNTER SYMPTOMS
OCCASIONAL FEELINGS OF UNSTEADINESS: 0
DEPRESSION: 0
LOSS OF SENSATION IN FEET: 0

## 2024-12-12 NOTE — PROGRESS NOTES
Patient received Pneumonia vaccine in left deltoid. Patient left with out any adverse reactions or complaints

## 2024-12-12 NOTE — PATIENT INSTRUCTIONS
Good to see you today  2.  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  Today you look healthy.  Great blood pressure.  Some asymmetry of the face with fullness on the left.  Nasal turbinates are full.  Palate and uvula were pushed over from the fullness as well.  1+ pitting in the shins as well.    4.  Consider the following internists who trained at our residency program:  Sara Moore or Antwan Lane,  Dr. Trevin Stephens,  Dr Mitch Schultz.    5.  Thanks for getting the colonoscopy repeat 2029  6.  Mammogram scheduled for January  7.  Last dexa excellent 2021 thanks for exercising.    8.  Prevnar today.  Please get the shingrix.  Tdap 2019  9.  It has been a privilege and a pleasure to be your doctor and friend.  Lets keep up the friend part please.    10.  Here until June

## 2024-12-13 ENCOUNTER — PHARMACY VISIT (OUTPATIENT)
Dept: PHARMACY | Facility: CLINIC | Age: 69
End: 2024-12-13
Payer: COMMERCIAL

## 2024-12-13 PROBLEM — M89.9 DISORDER OF BONE: Status: RESOLVED | Noted: 2024-10-30 | Resolved: 2024-12-13

## 2024-12-13 PROBLEM — H52.31 ANISOMETROPIA AND ANISEIKONIA: Status: RESOLVED | Noted: 2023-11-29 | Resolved: 2024-12-13

## 2024-12-13 PROBLEM — H52.32 ANISOMETROPIA AND ANISEIKONIA: Status: RESOLVED | Noted: 2023-11-29 | Resolved: 2024-12-13

## 2024-12-13 PROBLEM — R51.9 GENERALIZED HEADACHES: Status: RESOLVED | Noted: 2024-10-30 | Resolved: 2024-12-13

## 2024-12-13 PROBLEM — D58.2 ELEVATED HEMOGLOBIN (CMS-HCC): Status: RESOLVED | Noted: 2024-10-30 | Resolved: 2024-12-13

## 2024-12-13 PROBLEM — Z86.39 HISTORY OF METABOLIC DISORDER: Status: RESOLVED | Noted: 2024-10-30 | Resolved: 2024-12-13

## 2024-12-13 PROBLEM — J20.9 ACUTE BRONCHITIS WITH BRONCHOSPASM: Status: RESOLVED | Noted: 2024-10-30 | Resolved: 2024-12-13

## 2024-12-13 PROBLEM — R21 RASH: Status: RESOLVED | Noted: 2023-12-03 | Resolved: 2024-12-13

## 2024-12-13 PROBLEM — M54.50 LUMBAR PAIN: Status: RESOLVED | Noted: 2024-10-30 | Resolved: 2024-12-13

## 2024-12-13 PROBLEM — M25.521 ELBOW PAIN, RIGHT: Status: RESOLVED | Noted: 2023-12-03 | Resolved: 2024-12-13

## 2024-12-18 ENCOUNTER — APPOINTMENT (OUTPATIENT)
Dept: ALLERGY | Facility: CLINIC | Age: 69
End: 2024-12-18
Payer: COMMERCIAL

## 2024-12-30 DIAGNOSIS — E78.5 HYPERLIPIDEMIA, UNSPECIFIED HYPERLIPIDEMIA TYPE: ICD-10-CM

## 2024-12-30 PROCEDURE — RXMED WILLOW AMBULATORY MEDICATION CHARGE

## 2024-12-30 RX ORDER — ALIROCUMAB 75 MG/ML
INJECTION, SOLUTION SUBCUTANEOUS
Qty: 6 ML | Refills: 1 | Status: SHIPPED | OUTPATIENT
Start: 2024-12-30 | End: 2025-12-30

## 2024-12-31 ENCOUNTER — SPECIALTY PHARMACY (OUTPATIENT)
Dept: PHARMACY | Facility: CLINIC | Age: 69
End: 2024-12-31

## 2025-01-03 ENCOUNTER — HOSPITAL ENCOUNTER (OUTPATIENT)
Dept: RADIOLOGY | Facility: CLINIC | Age: 70
Discharge: HOME | End: 2025-01-03
Payer: COMMERCIAL

## 2025-01-03 ENCOUNTER — APPOINTMENT (OUTPATIENT)
Dept: OBSTETRICS AND GYNECOLOGY | Facility: CLINIC | Age: 70
End: 2025-01-03
Payer: COMMERCIAL

## 2025-01-03 VITALS
HEART RATE: 88 BPM | DIASTOLIC BLOOD PRESSURE: 81 MMHG | HEIGHT: 64 IN | BODY MASS INDEX: 35.51 KG/M2 | WEIGHT: 208 LBS | SYSTOLIC BLOOD PRESSURE: 131 MMHG

## 2025-01-03 DIAGNOSIS — N95.2 ATROPHIC VULVOVAGINITIS: Primary | ICD-10-CM

## 2025-01-03 DIAGNOSIS — Z01.419 ENCOUNTER FOR GYNECOLOGICAL EXAMINATION WITHOUT ABNORMAL FINDING: ICD-10-CM

## 2025-01-03 DIAGNOSIS — Q67.0 FACIAL ASYMMETRY: ICD-10-CM

## 2025-01-03 PROCEDURE — 88175 CYTOPATH C/V AUTO FLUID REDO: CPT

## 2025-01-03 PROCEDURE — 1036F TOBACCO NON-USER: CPT | Performed by: OBSTETRICS & GYNECOLOGY

## 2025-01-03 PROCEDURE — 1159F MED LIST DOCD IN RCRD: CPT | Performed by: OBSTETRICS & GYNECOLOGY

## 2025-01-03 PROCEDURE — 87624 HPV HI-RISK TYP POOLED RSLT: CPT

## 2025-01-03 PROCEDURE — 99397 PER PM REEVAL EST PAT 65+ YR: CPT | Performed by: OBSTETRICS & GYNECOLOGY

## 2025-01-03 PROCEDURE — 70486 CT MAXILLOFACIAL W/O DYE: CPT

## 2025-01-03 PROCEDURE — 3075F SYST BP GE 130 - 139MM HG: CPT | Performed by: OBSTETRICS & GYNECOLOGY

## 2025-01-03 PROCEDURE — 3079F DIAST BP 80-89 MM HG: CPT | Performed by: OBSTETRICS & GYNECOLOGY

## 2025-01-03 PROCEDURE — 3008F BODY MASS INDEX DOCD: CPT | Performed by: OBSTETRICS & GYNECOLOGY

## 2025-01-03 RX ORDER — ESTRADIOL 0.1 MG/G
0.5 CREAM VAGINAL 3 TIMES WEEKLY
Qty: 42.5 G | Refills: 3 | Status: SHIPPED | OUTPATIENT
Start: 2025-01-03

## 2025-01-03 RX ORDER — CLOBETASOL PROPIONATE 0.5 MG/G
OINTMENT TOPICAL 2 TIMES DAILY
Qty: 15 G | Refills: 2 | Status: SHIPPED | OUTPATIENT
Start: 2025-01-03

## 2025-01-03 NOTE — PROGRESS NOTES
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  She has no postmenopausal bleeding or discharge.  No dysuria or change in bowel habits.  She is current on her colonoscopy from 2024.    She occasionally has stress incontinence symptoms.    Her bone density in 2021 was normal.    She is using estradiol cream for the genitourinary syndrome of menopause intermittently.   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    Personal health questionnaire reviewed: yes.     Gynecologic History  No LMP recorded. Patient is postmenopausal.  Contraception: post menopausal status  Last Pap: 21. Results were: normal  Last mammogram: 23. Results were: normal    Obstetric History  OB History    Para Term  AB Living   2 2           SAB IAB Ectopic Multiple Live Births                  # Outcome Date GA Lbr Saeid/2nd Weight Sex Type Anes PTL Lv   2 Para            1 Para                Objective   Constitutional: Alert and in no acute distress. Well developed, well nourished.   Head and Face: Head and face: Normal.    Eyes: Normal external exam - nonicteric sclera, extraocular movements intact (EOMI) and no ptosis.   Neck: No neck asymmetry. Supple. Thyroid not enlarged and there were no palpable thyroid nodules.    Pulmonary: No respiratory distress.   Chest: Breasts: Normal appearance, no nipple discharge and no skin changes. Palpation of breasts and axillae: No palpable mass and no axillary lymphadenopathy.   Abdomen: Soft nontender; no abdominal mass palpated. No organomegaly. No hernias.   Genitourinary: External genitalia: Normal. No inguinal lymphadenopathy. Bartholin's Urethral and Skenes Glands: Normal. Urethra: Normal.  Bladder: Normal on palpation. Vagina: Normal. Cervix: Normal.  Uterus: Normal.  Right Adnexa/parametria: Normal.  Left Adnexa/parametria: Normal.  Inspection of Perianal Area: Normal.    Musculoskeletal: No joint swelling seen, normal movements of all extremities.   Skin: Normal skin color and pigmentation, normal skin turgor, and no rash.   Neurologic: Non-focal. Grossly intact.   Psychiatric: Alert and oriented x 3. Affect normal to patient baseline. Mood: Appropriate.  Physical Exam     Assessment/Plan   Healthy female exam.  This is a 69-year-old female with a normal exam.  A Pap smear was sent.    Her routine mammogram is scheduled with tomosynthesis.  We discussed refilling the estradiol cream and using a pea-sized amount at the vaginal opening 3 times weekly for the full effect.  She does request a refill of clobetasol ointment to assist with labial agglutination symptoms.    I will see her routinely in 1 year.  Education reviewed: self breast exams.  Mammogram ordered.

## 2025-01-06 PROBLEM — E78.5 HYPERLIPIDEMIA: Status: RESOLVED | Noted: 2023-11-29 | Resolved: 2025-01-06

## 2025-01-06 PROBLEM — J30.81 ALLERGIC RHINITIS DUE TO ANIMAL HAIR AND DANDER: Status: RESOLVED | Noted: 2024-10-30 | Resolved: 2025-01-06

## 2025-01-06 PROBLEM — H81.10 BENIGN PAROXYSMAL POSITIONAL VERTIGO: Status: RESOLVED | Noted: 2024-10-30 | Resolved: 2025-01-06

## 2025-01-07 ENCOUNTER — OFFICE VISIT (OUTPATIENT)
Dept: CARDIOLOGY | Facility: CLINIC | Age: 70
End: 2025-01-07
Payer: COMMERCIAL

## 2025-01-07 VITALS
HEIGHT: 64 IN | DIASTOLIC BLOOD PRESSURE: 85 MMHG | OXYGEN SATURATION: 98 % | WEIGHT: 207 LBS | BODY MASS INDEX: 35.34 KG/M2 | SYSTOLIC BLOOD PRESSURE: 138 MMHG | HEART RATE: 81 BPM

## 2025-01-07 DIAGNOSIS — Z91.09 ENVIRONMENTAL ALLERGIES: ICD-10-CM

## 2025-01-07 DIAGNOSIS — E78.49 OTHER HYPERLIPIDEMIA: ICD-10-CM

## 2025-01-07 DIAGNOSIS — I10 HYPERTENSION, UNSPECIFIED TYPE: ICD-10-CM

## 2025-01-07 PROCEDURE — 1126F AMNT PAIN NOTED NONE PRSNT: CPT | Performed by: INTERNAL MEDICINE

## 2025-01-07 PROCEDURE — 93010 ELECTROCARDIOGRAM REPORT: CPT | Performed by: INTERNAL MEDICINE

## 2025-01-07 PROCEDURE — 93005 ELECTROCARDIOGRAM TRACING: CPT | Performed by: INTERNAL MEDICINE

## 2025-01-07 PROCEDURE — 1159F MED LIST DOCD IN RCRD: CPT | Performed by: INTERNAL MEDICINE

## 2025-01-07 PROCEDURE — 3075F SYST BP GE 130 - 139MM HG: CPT | Performed by: INTERNAL MEDICINE

## 2025-01-07 PROCEDURE — 99214 OFFICE O/P EST MOD 30 MIN: CPT | Performed by: INTERNAL MEDICINE

## 2025-01-07 PROCEDURE — 3008F BODY MASS INDEX DOCD: CPT | Performed by: INTERNAL MEDICINE

## 2025-01-07 PROCEDURE — 1036F TOBACCO NON-USER: CPT | Performed by: INTERNAL MEDICINE

## 2025-01-07 PROCEDURE — 3079F DIAST BP 80-89 MM HG: CPT | Performed by: INTERNAL MEDICINE

## 2025-01-07 PROCEDURE — 1160F RVW MEDS BY RX/DR IN RCRD: CPT | Performed by: INTERNAL MEDICINE

## 2025-01-07 PROCEDURE — 99214 OFFICE O/P EST MOD 30 MIN: CPT | Mod: 25 | Performed by: INTERNAL MEDICINE

## 2025-01-07 ASSESSMENT — PAIN SCALES - GENERAL: PAINLEVEL_OUTOF10: 0-NO PAIN

## 2025-01-07 ASSESSMENT — ENCOUNTER SYMPTOMS
OCCASIONAL FEELINGS OF UNSTEADINESS: 0
LOSS OF SENSATION IN FEET: 0
DEPRESSION: 0

## 2025-01-07 NOTE — PROGRESS NOTES
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 Oconnell in 2023.    Had COVID around [place], woke up with fever and palpitations the first day but they have since resolved. Palpitations are dependent on water intake and caffeine consumption. She has had some residual brain fog and cough that is improving since having COVID but otherwise denies any chest pain or dyspnea. Works out with  for exercise tolerance. Home BP 120s/70s-80s.      Has been on Praluent, was having injection site rashes that have gradually improved and resolves with topical steroid. Last shot on Saturday. Difficulty taking statins. Atorvastatin caused crippling myalgias, fluvastatin caused abdominal pain and elevated LFTs, and rosuvastatin caused significant elevation in CPK to 2589 in 2019.     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 PRN itching/allergies, Disp: 6 mL, Rfl: 11    cholecalciferol (Vitamin D-3) 5,000 Units tablet, Take 1 tablet (5,000 Units)  "by mouth once daily., Disp: , Rfl:     Clarinex-D 12 HOUR 2.5-120 mg 12 hr tablet, TAKE ONE TABLET BY MOUTH EVERY 12 HOURS AS NEEDED, Disp: 60 tablet, Rfl: 3    clindamycin (Cleocin T) 1 % gel, Apply topically 2 times a day., Disp: , Rfl:     clobetasol (Temovate) 0.05 % ointment, Apply topically 2 times a day. After applying topically once or twice daily for a week, then use a small amount sparingly twice weekly., Disp: 15 g, Rfl: 2    doxycycline (Periostat) 20 mg tablet, Take 1 tablet (20 mg) by mouth once daily., Disp: , Rfl:     EPINEPHrine (Auvi-Q) 0.3 mg/0.3 mL injection syringe, inject intramuscularly as directed for anaphylaxis, Disp: 1 each, Rfl: 0    estradiol (Estrace) 0.01 % (0.1 mg/gram) vaginal cream, Insert 0.125 Applicatorfuls (0.5 g) into the vagina 3 times a week. Use a pea-sized amount (about 0.125 applicatorful) at the vaginal opening 3 times a week., Disp: 42.5 g, Rfl: 3    folic acid/vit B complex and C (B complex-vitamin C-folic acid) 400 mcg tablet extended release, Take 1 tablet by mouth once daily., Disp: , Rfl:     hydroCHLOROthiazide (HYDRODiuril) 25 mg tablet, Take 1 tablet (25 mg) by mouth once daily., Disp: 90 tablet, Rfl: 3    metFORMIN (Glucophage) 500 mg tablet, Take 2 tablets (1,000 mg) by mouth once daily., Disp: 180 tablet, Rfl: 3    olmesartan (Benicar) 5 mg tablet, Take 1 tablet (5 mg) by mouth once daily., Disp: 90 tablet, Rfl: 3    sulfacetamide suspension (Klaron) 10 % suspension topical, Apply 1 Application topically 2 times a day., Disp: 118 mL, Rfl: 3    /85 (BP Location: Left arm, Patient Position: Sitting)   Pulse 81   Ht 1.626 m (5' 4\")   Wt 93.9 kg (207 lb)   SpO2 98%   BMI 35.53 kg/m²     PHYSICAL EXAM:  Constitutional: Well-developed female in no acute distress.  HEENT: NCAT. EOMI. No JVD  Respiratory: CTAB. No wheezes, crackles, or rhonchi. Normal respiratory effort on RA.  Cardiovascular: RRR, normal S1/S2, No murmurs, rubs, or gallops.   MSK: WWP, no " peripheral edema  Skin: No lesions, wounds, or bruising    LABS  WBC (x10*3/uL)   Date Value   10/03/2024 6.8     Hemoglobin (g/dL)   Date Value   10/03/2024 14.5     Platelets (x10*3/uL)   Date Value   10/03/2024 258     Sodium (mmol/L)   Date Value   10/03/2024 140     Potassium (mmol/L)   Date Value   10/03/2024 3.9     Chloride (mmol/L)   Date Value   10/03/2024 100     Bicarbonate (mmol/L)   Date Value   10/03/2024 31     Urea Nitrogen (mg/dL)   Date Value   10/03/2024 18     Creatinine (mg/dL)   Date Value   10/03/2024 0.57     Calcium (mg/dL)   Date Value   10/03/2024 9.4     Total Protein (g/dL)   Date Value   10/03/2024 7.0     Bilirubin, Total (mg/dL)   Date Value   10/03/2024 0.6     Alkaline Phosphatase (U/L)   Date Value   10/03/2024 65     ALT (U/L)   Date Value   10/03/2024 33     AST (U/L)   Date Value   10/03/2024 27     Glucose (mg/dL)   Date Value   10/03/2024 108 (H)     Cholesterol (mg/dL)   Date Value   10/03/2024 206 (H)   07/27/2023 203 (H)   05/19/2022 209 (H)   09/23/2021 221 (H)     LDL Calculated (mg/dL)   Date Value   10/03/2024 125 (H)     HDL-Cholesterol (mg/dL)   Date Value   10/03/2024 53.9     HDL (mg/dL)   Date Value   07/27/2023 53.4   05/19/2022 60.1   09/23/2021 52.0     Triglycerides (mg/dL)   Date Value   10/03/2024 134   07/27/2023 211 (H)   05/19/2022 137   09/23/2021 223 (H)     Lab Results   Component Value Date    LDLF 107 (H) 07/27/2023       ASSESSMENT/PLAN:  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 Has significant intolerances to statins including myalgias and myositis with CK elevation. Last FLP with  and  in 10/24, normal LFTs. Pt would like to continue with dietary modification and repeat FLP. If continues to be elevated, will increase praluent. On hydrochlorothiazide 25mg and olmesartan 5mg for HTN. BP today slightly elevated at 138/85, advised home BP log and can increase  olmesartan if it continues to trend upward.    Follow-up in the office in 1 year.    Pt seen and discussed with attending physician, Dr. Oconnell.    Anne Miller MD  PGY-2 Internal medicine

## 2025-01-08 ENCOUNTER — PHARMACY VISIT (OUTPATIENT)
Dept: PHARMACY | Facility: CLINIC | Age: 70
End: 2025-01-08
Payer: COMMERCIAL

## 2025-01-08 ENCOUNTER — OFFICE VISIT (OUTPATIENT)
Dept: ALLERGY | Facility: CLINIC | Age: 70
End: 2025-01-08
Payer: COMMERCIAL

## 2025-01-08 DIAGNOSIS — R93.0 ABNORMAL CT OF PARANASAL SINUSES: Primary | ICD-10-CM

## 2025-01-08 DIAGNOSIS — J01.00 ACUTE NON-RECURRENT MAXILLARY SINUSITIS: Primary | ICD-10-CM

## 2025-01-08 DIAGNOSIS — J30.2 SEASONAL ALLERGIES: ICD-10-CM

## 2025-01-08 DIAGNOSIS — Z91.038 ALLERGY TO INSECT VENOM: ICD-10-CM

## 2025-01-08 PROCEDURE — 99214 OFFICE O/P EST MOD 30 MIN: CPT | Performed by: ALLERGY & IMMUNOLOGY

## 2025-01-08 PROCEDURE — 95117 IMMUNOTHERAPY INJECTIONS: CPT | Performed by: ALLERGY & IMMUNOLOGY

## 2025-01-08 PROCEDURE — 1159F MED LIST DOCD IN RCRD: CPT | Performed by: ALLERGY & IMMUNOLOGY

## 2025-01-08 PROCEDURE — 95149 ANTIGEN THERAPY SERVICES: CPT | Performed by: ALLERGY & IMMUNOLOGY

## 2025-01-08 PROCEDURE — 1160F RVW MEDS BY RX/DR IN RCRD: CPT | Performed by: ALLERGY & IMMUNOLOGY

## 2025-01-08 PROCEDURE — 1036F TOBACCO NON-USER: CPT | Performed by: ALLERGY & IMMUNOLOGY

## 2025-01-09 ENCOUNTER — OFFICE VISIT (OUTPATIENT)
Dept: OTOLARYNGOLOGY | Facility: CLINIC | Age: 70
End: 2025-01-09
Payer: COMMERCIAL

## 2025-01-09 ENCOUNTER — TELEPHONE (OUTPATIENT)
Dept: ALLERGY | Facility: CLINIC | Age: 70
End: 2025-01-09

## 2025-01-09 ENCOUNTER — HOSPITAL ENCOUNTER (OUTPATIENT)
Dept: RADIOLOGY | Facility: CLINIC | Age: 70
Discharge: HOME | End: 2025-01-09
Payer: COMMERCIAL

## 2025-01-09 VITALS — BODY MASS INDEX: 35.51 KG/M2 | HEIGHT: 64 IN | WEIGHT: 208 LBS

## 2025-01-09 DIAGNOSIS — R93.0 ABNORMAL CT OF PARANASAL SINUSES: ICD-10-CM

## 2025-01-09 DIAGNOSIS — J01.00 ACUTE NON-RECURRENT MAXILLARY SINUSITIS: Primary | ICD-10-CM

## 2025-01-09 DIAGNOSIS — J32.0 CHRONIC MAXILLARY SINUSITIS: ICD-10-CM

## 2025-01-09 DIAGNOSIS — J32.3 CHRONIC SPHENOIDAL SINUSITIS: ICD-10-CM

## 2025-01-09 DIAGNOSIS — Z12.31 SCREENING MAMMOGRAM FOR BREAST CANCER: ICD-10-CM

## 2025-01-09 DIAGNOSIS — J32.8 OTHER CHRONIC SINUSITIS: Primary | ICD-10-CM

## 2025-01-09 LAB
ATRIAL RATE: 76 BPM
P AXIS: 37 DEGREES
P OFFSET: 203 MS
P ONSET: 153 MS
PR INTERVAL: 142 MS
Q ONSET: 224 MS
QRS COUNT: 13 BEATS
QRS DURATION: 94 MS
QT INTERVAL: 378 MS
QTC CALCULATION(BAZETT): 425 MS
QTC FREDERICIA: 408 MS
R AXIS: -21 DEGREES
T AXIS: -2 DEGREES
T OFFSET: 413 MS
VENTRICULAR RATE: 76 BPM

## 2025-01-09 PROCEDURE — 1160F RVW MEDS BY RX/DR IN RCRD: CPT | Performed by: OTOLARYNGOLOGY

## 2025-01-09 PROCEDURE — 99203 OFFICE O/P NEW LOW 30 MIN: CPT | Performed by: OTOLARYNGOLOGY

## 2025-01-09 PROCEDURE — 1159F MED LIST DOCD IN RCRD: CPT | Performed by: OTOLARYNGOLOGY

## 2025-01-09 PROCEDURE — 1036F TOBACCO NON-USER: CPT | Performed by: OTOLARYNGOLOGY

## 2025-01-09 PROCEDURE — 77067 SCR MAMMO BI INCL CAD: CPT

## 2025-01-09 RX ORDER — AMOXICILLIN AND CLAVULANATE POTASSIUM 875; 125 MG/1; MG/1
1 TABLET, FILM COATED ORAL 2 TIMES DAILY
Qty: 28 TABLET | Refills: 0 | Status: SHIPPED | OUTPATIENT
Start: 2025-01-09 | End: 2025-01-23

## 2025-01-09 NOTE — ASSESSMENT & PLAN NOTE
I will reach out to Dr. Miller to help coordinate an appointment and start her on Augmentin BID. She currently is on Doxy daily for her skin so I don't feel that Doxy BID is appropriate. Also, she was offered a medrol dose pack due to her sinus pain, but she declines at this time. She states she has some pressure, but not nathan pain.     In addition, she has extensive mucosal thickening so she will see ENT upon completion of the abx.

## 2025-01-09 NOTE — PROGRESS NOTES
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    Chief Complaint   Patient presents with    Sinusitis        Patient has concerns about her CT scan of her sinus. She had a CT scan done and was told to see ENT. The sinus CT report states inspissated secretions and she doesn't know what that is. She had the CT scan done because she is having excessive left sided PND and left sided maxillary facial pain. She was referred to Dr. Miller but has not yet made the appointment.         Physical Exam  Constitutional:       Appearance: Normal appearance.   HENT:      Head: Normocephalic and atraumatic.   Eyes:      Extraocular Movements: Extraocular movements intact.      Conjunctiva/sclera: Conjunctivae normal.      Pupils: Pupils are equal, round, and reactive to light.   Pulmonary:      Effort: Pulmonary effort is normal.   Musculoskeletal:      Cervical back: Normal range of motion.   Neurological:      Mental Status: She is alert and oriented to person, place, and time. Mental status is at baseline.   Psychiatric:         Mood and Affect: Mood normal.         Behavior: Behavior normal.         Thought Content: Thought content normal.         Judgment: Judgment normal.           Assessment/Plan         Acute non-recurrent maxillary sinusitis  I will reach out to Dr. Miller to help coordinate an appointment and start her on Augmentin BID. She currently is on Doxy daily for her skin so I don't feel that Doxy BID is appropriate. Also, she was offered a medrol dose pack due to her sinus pain, but she declines at this time. She states she has some pressure, but not nathan pain.     In addition, she has extensive mucosal thickening so she will see ENT upon completion of the abx.           Malena Tamez MD

## 2025-01-09 NOTE — PATIENT INSTRUCTIONS
PATIENT INSTRUCTIONS ARE COMPLETE and READY TO PRINT    Mometasone (Nasonex):  Start using mometasone (Nasonex) nasal spray for 2 months. Do 1 spray in each nostril twice a day. There is less nasal drying when you space out mometasone to one spray in the morning and one spray in the evening. If that is not possible, you can instead do 2 sprays in each nostril once a day. After using Nasonex for 2 months, you can stop using it. Nasal spray instructions are below.    Nasal spray instructions:  Please take the prescribed nasal spray as directed. BE SURE TO POINT THE SPRAY SLIGHTLY OUTWARDS TOWARD THE CORNER OF THE EYE ON THE SAME SIDE NOSTRIL. This will ensure you are treating the appropriate parts of your nose that are swollen or inflamed. Also, avoid sniffing in the spray through your nose as this will cause the spray to go towards the back of your nose and down the back of your throat. Instead, blow out through your mouth while spraying into your nostril. This elevates the soft palate in the back of your mouth, which helps the spray to stay in your nose. After spraying, if the fluid starts dripping out of your nose, you can sniff gently to keep the fluid in your nose. This medication can take up to 1 month before you notice full benefit. You MUST use it every day for it to be effective.    Augmentin:  Please complete the 14-day course of Augmentin prescribed by Dr. Tamez. Take it twice a day after breakfast and supper. In the middle of the day, take a daily probiotic or yogurt. Please avoid consuming any dairy within 2 hours of taking Augmentin.    Follow up:  Feel free to send me a message if you have any questions or concerns. My office number is [phone].  _________________________________________________________________________________________    Welcome to Dr. Walt Hernandez’s clinic. We are here to assist you through your ENT care at [facility]. Dr. Walt Hernandez is a Rhinologist who is an  expert with advanced fellowship training in Endoscopic Sinus Surgery and Skull Base Surgery.    Dr. Walt Hernandez’s office number is [phone]. Please use this number to contact his care team regardless of which office you use to access care. This number is the most direct way to communicate with all the members of the care team.    Malena Vásquez CNP is a nurse practitioner who is a part of Dr. Hernandez’s team. She works collaboratively with Dr. Hernandez to meet your goals. This often may include seeing you for more urgent appointments or follow-up visits. She follows the same protocols and guidelines for chronic management of your condition.    Lou To RN is Dr. Hernandez’s primary nurse. She can be reached by calling the office as well. Lou is in clinic Monday through Friday. Non-urgent calls will be returned within 24 hours of the call.    Johanny Gorman is Dr. Hernandez’s  and she answers the office phone from 9am-4pm Mon-Thurs. On Friday, she answers the phone from 9am-3pm. Call [phone]. She can help you with scheduling of appointments, general questions and information. You may need to leave a message if she is helping another patient. In this case, someone from the team will call you back the same day if you leave your message before 3pm, or the next business morning if after 3pm.    For your convenience, Dr. Hernandez sees patients at different [facility] locations including the [facility] and at [facility]. While we try to make your appointments as convenient as possible, occasionally a visit to another location may be necessary to provide the best care for you.    Dr. Hernandez makes every effort to run on time for your appointments. Therefore, if you are more than 15 to 20 minutes late, your appointment may need to be rescheduled to another day. We appreciate your understanding.    We look forward to working with you to meet your healthcare goals.    Scribe  Attestation  By signing my name below, I, Wale Delgado, attest that this documentation has been prepared under the direction and in the presence of Walt Miller MD.

## 2025-01-09 NOTE — PROGRESS NOTES
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 The patient describes her sinus/nose symptoms as increased #1 left-sided nasal obstruction. She also noted #2 left sided nasal drainage, worse posteriorly, and #3 facial pressure. She also notes #4 left-sided epiphora. She had COVID in mid-December 2024. She denies facial pain, periorbital edema, loss of smell/taste, and epistaxis. The patient has taken Claritinex D 12 medications to alleviate the problem. The medication has helped her breathe out of her nose at night. She has not tried nasal saline irrigations. She has a history of allergic rhinitis or allergies. She is on allergy immunotherapy through Dr. Tamez. She denies a history of aspirin sensitivity. She reports 0 sinus infections per year requiring antibiotic treatment. Dr. Tamez recently prescribed her a 14-day course of Augmentin.    History of prior nasal/sinus surgery or procedure: Denies    Past Medical History  She has a past medical history of Abnormal levels of other serum enzymes (12/24/2019), Acute frontal sinusitis, unspecified (02/02/2016), Acute upper respiratory infection, unspecified (06/10/2018), Acute vaginitis (09/04/2015), Allergic, Allergic rhinitis due to pollen (04/26/2016), Allergy status to unspecified drugs, medicaments and biological substances (08/18/2016), Anaphylactic shock, unspecified, initial encounter (08/26/2015), Asymptomatic menopausal state (09/30/2016), Cellulitis of unspecified part of limb (07/08/2019), Combined forms of age-related cataract, right eye (03/09/2022), 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), Cough, unspecified (12/22/2014), Dietary counseling and surveillance (05/26/2018), Disease of thyroid gland, Elevated blood-pressure reading, without diagnosis of hypertension (04/21/2017),  Encounter for gynecological examination (general) (routine) without abnormal findings (09/30/2016), Encounter for gynecological examination (general) (routine) without abnormal findings (09/04/2015), Encounter for other screening for malignant neoplasm of breast (12/19/2019), Encounter for screening for malignant neoplasm of colon (12/07/2018), Encounter for screening for osteoporosis (10/01/2020), Encounter for screening for respiratory tuberculosis (03/22/2016), Encounter for screening mammogram for malignant neoplasm of breast (10/27/2016), Encounter for screening mammogram for malignant neoplasm of breast (09/04/2015), Encounter for therapeutic drug level monitoring (12/17/2019), Heart murmur, Hyperlipidemia, Hypertension, Lichen sclerosus et atrophicus (04/21/2017), Localized swelling, mass and lump, head (01/10/2020), Nontoxic single thyroid nodule (03/21/2019), Other allergy status, other than to drugs and biological substances, 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), Other specified disorders of bone, unspecified site (02/22/2018), Other specified personal risk factors, not elsewhere classified (09/04/2015), Other specified soft tissue disorders (11/30/2016), Pain in left hand (03/04/2021), Personal history of other diseases of the circulatory system, Personal history of other diseases of the nervous system and sense organs (06/11/2021), Personal history of other diseases of the respiratory system (04/01/2020), Personal history of other diseases of the respiratory system (12/31/2018), Personal history of other drug therapy (10/09/2019), Personal history of other drug therapy (03/30/2022), Personal history of other endocrine, nutritional and metabolic disease, Personal history of other endocrine, nutritional and metabolic disease, Personal history of other medical treatment (10/11/2017), Personal history of other specified conditions (04/14/2022), Personal history of other specified conditions,  Personal history of other specified conditions (04/21/2017), Polyp of cervix uteri (09/04/2015), Postnasal drip (02/28/2022), Preglaucoma, unspecified, unspecified eye (04/21/2017), Rash and other nonspecific skin eruption (10/12/2015), Sprain of unspecified site of right knee, initial encounter (08/26/2014), Toxic effect of venom of hornets, accidental (unintentional), initial encounter (05/26/2016), Unspecified disorder of refraction (04/21/2017), Unspecified injury of unspecified lower leg, initial encounter (03/21/2019), and Unspecified injury of unspecified wrist, hand and finger(s), initial encounter (04/21/2017).    Patient Active Problem List    Diagnosis Date Noted    Allergic sinusitis 10/30/2024    Atrophic vulvovaginitis 10/30/2024    History of thyroid disorder 10/30/2024    History of hypertension 10/30/2024    Lattice degeneration of retina, right 10/30/2024    Leg edema 10/30/2024    Seasonal allergies 06/26/2024    Presbyopia 03/22/2024    Allergic rhinitis due to cats 11/29/2023    Allergic rhinitis due to pollen 11/29/2023    Allergy to dust 11/29/2023    Environmental allergies 11/29/2023    Acne 11/29/2023    Astigmatism 11/29/2023    Myopia 11/29/2023    Chronic rhinitis 11/29/2023    Constipation 11/29/2023    Post-nasal drainage 11/29/2023    Familial hypercholesterolemia 11/29/2023    Glaucoma suspect of both eyes 11/29/2023    Allergy to insect venom 11/29/2023    Hypertension 11/29/2023    Irritable bowel 11/29/2023    Multiple thyroid nodules 11/29/2023    Nasal septal deviation 11/29/2023    Hypertrophy of both inferior nasal turbinates 11/29/2023    Obesity (BMI 30.0-34.9) 11/29/2023    Palpitations 11/29/2023    Pseudophakia of left eye 11/29/2023    Pseudophakia 11/29/2023    Raised intraocular pressure of right eye 11/29/2023    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    Social History  She reports that she has never smoked. She has never been exposed to tobacco smoke. She has never used smokeless tobacco. She reports current alcohol use. She reports that she does not use drugs.    Family History  Family History   Problem Relation Name Age of Onset    Colon cancer Mother Namcy Monheim     Diabetes Mother Namcy Monheim     Hypertension Mother Namcy Monheim     Hyperlipidemia Mother Namcy Monheim     Colon polyps Mother Namcy Monheim     Arthritis Mother Namcy Monheim     Hearing loss Mother Namcy Monheim     Heart disease Mother Namcy Monheim     Diabetes type II Mother Namcy Monheim     Heart murmur Mother Namcy Monheim     Heart disease Father Osito Monheim     Hypertension Father Osito Monheim     Hyperlipidemia Father Osito Monheim     Accidental death Father Osito Monheim     Hyperlipidemia Sister Saloni Bojorqueztutz     Hyperlipidemia Brother Osito W Monheim     Colon polyps Brother Osito W Monheim     Heart murmur Brother Osito W Monheim     Arthritis Son Omer Chandra     Diabetes Maternal Grandmother Emilee     Sudden death Maternal Grandfather celina Palacios     Early natural death Maternal Grandfather celina Palacios     Accidental death Maternal Grandfather celina Palacios     Colon cancer Mother's Sister      Diabetes Mother's Sister Jasmyn Frasca     Heart disease Mother's Sister Jasmyn Frasca     Diabetes Mother's Sister Shilpa     Colon polyps Mother's Sister Shilpa     Hearing loss Mother's Sister Shilpa     Heart disease Mother's Sister Shilpa     Diabetes type II Mother's Sister Shilpa     Colon  cancer Other      Breast cancer Paternal Cousin  40 - 49     Allergies  Lisinopril, Prochlorperazine, Ramipril, Statins-hmg-coa reductase inhibitors, Cephalexin, and Povidone-iodine    Medications  Current Outpatient Medications:     alirocumab (Praluent Pen) 75 mg/mL pen injector, INJECT 75MG (1 PEN) UNDER THE SKIN ONCE EVERY 2 WEEKS AS DIRECTED, Disp: 6 mL, Rfl: 1    amoxicillin-pot clavulanate (Augmentin) 875-125 mg tablet, Take 1 tablet by mouth 2 times a day for 14 days., Disp: 28 tablet, Rfl: 0    azelastine (Optivar) 0.05 % ophthalmic solution, Administer 1 drop into both eyes 2 times a day. PRN itching/allergies, Disp: 6 mL, Rfl: 11    cholecalciferol (Vitamin D-3) 5,000 Units tablet, Take 1 tablet (5,000 Units) by mouth once daily., Disp: , Rfl:     Clarinex-D 12 HOUR 2.5-120 mg 12 hr tablet, TAKE ONE TABLET BY MOUTH EVERY 12 HOURS AS NEEDED, Disp: 60 tablet, Rfl: 3    clindamycin (Cleocin T) 1 % gel, Apply topically 2 times a day., Disp: , Rfl:     clobetasol (Temovate) 0.05 % ointment, Apply topically 2 times a day. After applying topically once or twice daily for a week, then use a small amount sparingly twice weekly., Disp: 15 g, Rfl: 2    doxycycline (Periostat) 20 mg tablet, Take 1 tablet (20 mg) by mouth once daily., Disp: , Rfl:     EPINEPHrine (Auvi-Q) 0.3 mg/0.3 mL injection syringe, inject intramuscularly as directed for anaphylaxis, Disp: 1 each, Rfl: 0    estradiol (Estrace) 0.01 % (0.1 mg/gram) vaginal cream, Insert 0.125 Applicatorfuls (0.5 g) into the vagina 3 times a week. Use a pea-sized amount (about 0.125 applicatorful) at the vaginal opening 3 times a week., Disp: 42.5 g, Rfl: 3    folic acid/vit B complex and C (B complex-vitamin C-folic acid) 400 mcg tablet extended release, Take 1 tablet by mouth once daily., Disp: , Rfl:     hydroCHLOROthiazide (HYDRODiuril) 25 mg tablet, Take 1 tablet (25 mg) by mouth once daily., Disp: 90 tablet, Rfl: 3    metFORMIN (Glucophage) 500 mg tablet,  Take 2 tablets (1,000 mg) by mouth once daily., Disp: 180 tablet, Rfl: 3    olmesartan (Benicar) 5 mg tablet, Take 1 tablet (5 mg) by mouth once daily., Disp: 90 tablet, Rfl: 3    sulfacetamide suspension (Klaron) 10 % suspension topical, Apply 1 Application topically 2 times a day., Disp: 118 mL, Rfl: 3    Review of Systems   Negative for constitutional, eyes, cardiac, pulmonary, hepatic, renal, digestive, hematologic, epileptic, syncopal, musculo-skeletal, mental health, integumentary, hypertensive, lipid, arthritic, diabetic, thyroid or neurologic disorders (except as listed in the HPI, PMH and Problem List).    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 Start Augmentin prescribed by Dr. Tamez. Rec mometasone nasal spray x 2 months. Provided recs on appropriate use.    Plan   Reassurance and counseling was provided to the patient, and her condition was extensively discussed, including as noted below:    I personally reviewed the patient's CT scan images and results. I discussed the results personally with the patient. The following findings were discussed: CT Sinus: 1/3/2025: Shows thickening of mucosa circumferentially in the left maxillary sinus with frothy secretions posteriorly. She has a very small left sphenoid sinus with some very mild mucosal thickening. She has a smaller right sphenoid sinus as well. Perhaps there is some patchy thickening in the ethmoids. Left frontal sinus is atelectatic. The septum is relatively straight.    I personally reviewed the notes from Dr. Margarito Santana from 2/24/2022. This is contributing to my history, assessment, and plan: pt with chronic  rhinitis and epistaxis that was managed conservatively    Nasal endoscopy. Findings: as noted.  She was instructed to complete the 14-day course of Augmentin prescribed by Dr. Tamez. She was advised to take Augmentin after meals, take a daily probiotic or yogurt in the middle of the day, and avoid dairy within 2 hours of taking Augmentin.  I recommended that she starts mometasone nasal spray, 1 spray in each nostril twice daily for 2 months. After that, she can stop mometasone if she is feeling improved. She was instructed on the proper technique of aiming towards the lateral wall of the nose on each side.  Follow up with me as needed.    Walt Miller MD [facility]  Division of Rhinology, Sinus, and Skull Base Surgery       Exam   General: This is a healthy appearing female who appears her stated age. The patient is alert and appropriately verbally conversant without hoarseness.    Face: The face was inspected and no cutaneous masses or lesions were visualized. There was no erythema or edema noted. Facial movement was symmetric without weakness. No skin lesions were detected. There was no sinus tenderness elicited. The parotid and submandibular glands were normal to palpation.    Eyes: Extra-ocular muscle function was intact. No nystagmus was observed. Pupils were equal.    Cranial Nerves: Cranial nerves II, III, IV, and VI were noted to be intact via extra-ocular muscle movement testing. Cranial nerve VII noted to be intact and symmetric by facial movement. Cranial nerve VIII was tested with whispered voice examination and revealed symmetric hearing. Cranial nerves IX and X noted to be intact by gag reflex and palatal movement. Cranial nerve XII noted to be intact by active and symmetric tongue movement.    Nose: Examination of the nose revealed the nasal dorsum to be midline. Intranasal exam reveals the septum was relatively straight. The inferior turbinates were normal. No masses, polyps, mucopus, or other lesion  on anterior rhinoscopy. Brief endoscopic assisted view of the outflow tracts and middle meatus was revealing of thick grey drainage in the left middle meatus    Oral Cavity: Examination of the oral cavity revealed no mass lesions nor infection. The palate was noted to be intact without evidence of clefting. The tongue exhibited normal mobility. Mucosa was moist without lesion. The lips were free of lesion. Gums were free of inflammation. Dentition: normal without obvious infection or inflammation.    Oropharynx: The oral pharynx was free of mass lesion or mucosal abnormality. The palate was noted to be without lesion. The uvula was normal appearing. The tonsils were surgically absent.    Ears: Examination of the ears revealed that the auricles were normally formed with no lesions. The external auditory canals were cleaned of any obstructing cerumen. The tympanic membranes were intact and freely mobile to pneumatoscopy. There are no significant retraction pockets. There is no inflammation visualized. No effusions are seen.    Neck: Visualization and palpation of the neck revealed no mass lesions, no thyromegaly or thyroid masses. No skin lesions or inflammatory processes were detected. The cervical musculature was normal to palpation.    Cervical Lymphatics: There were no palpable lymph nodes in the posterior triangle, submandibular triangle, jugulodigastric region, or central neck.    CV: peripheral perfusion intact, no cyanosis, clubbing or edema of extremities.    Respiratory: Normal inspiration and expiration and chest wall expansion, no use of accessory muscles to breathe, no stridor or stertor.       Scribe Attestation  By signing my name below, I, Wale Delgado, attest that this documentation has been prepared under the direction and in the presence of Walt Miller MD.

## 2025-01-29 PROCEDURE — RXMED WILLOW AMBULATORY MEDICATION CHARGE

## 2025-02-05 ENCOUNTER — PHARMACY VISIT (OUTPATIENT)
Dept: PHARMACY | Facility: CLINIC | Age: 70
End: 2025-02-05
Payer: COMMERCIAL

## 2025-02-05 ENCOUNTER — CLINICAL SUPPORT (OUTPATIENT)
Dept: ALLERGY | Facility: CLINIC | Age: 70
End: 2025-02-05
Payer: COMMERCIAL

## 2025-02-05 DIAGNOSIS — Z91.038 ALLERGY TO INSECT VENOM: ICD-10-CM

## 2025-02-05 DIAGNOSIS — J30.2 SEASONAL ALLERGIES: ICD-10-CM

## 2025-02-05 PROCEDURE — 95117 IMMUNOTHERAPY INJECTIONS: CPT | Performed by: ALLERGY & IMMUNOLOGY

## 2025-02-05 PROCEDURE — 95149 ANTIGEN THERAPY SERVICES: CPT | Performed by: ALLERGY & IMMUNOLOGY

## 2025-02-06 ENCOUNTER — OFFICE VISIT (OUTPATIENT)
Dept: ORTHOPEDIC SURGERY | Facility: CLINIC | Age: 70
End: 2025-02-06
Payer: COMMERCIAL

## 2025-02-06 ENCOUNTER — HOSPITAL ENCOUNTER (OUTPATIENT)
Dept: RADIOLOGY | Facility: CLINIC | Age: 70
Discharge: HOME | End: 2025-02-06
Payer: COMMERCIAL

## 2025-02-06 VITALS — WEIGHT: 208 LBS | BODY MASS INDEX: 35.51 KG/M2 | HEIGHT: 64 IN

## 2025-02-06 DIAGNOSIS — M65.311 TRIGGER THUMB OF RIGHT HAND: Primary | ICD-10-CM

## 2025-02-06 DIAGNOSIS — M19.049 CMC ARTHRITIS: Primary | ICD-10-CM

## 2025-02-06 DIAGNOSIS — M19.049 CMC ARTHRITIS: ICD-10-CM

## 2025-02-06 PROCEDURE — 73130 X-RAY EXAM OF HAND: CPT | Mod: RT

## 2025-02-06 PROCEDURE — 20550 NJX 1 TENDON SHEATH/LIGAMENT: CPT | Mod: RT | Performed by: ORTHOPAEDIC SURGERY

## 2025-02-06 PROCEDURE — 2500000004 HC RX 250 GENERAL PHARMACY W/ HCPCS (ALT 636 FOR OP/ED): Performed by: ORTHOPAEDIC SURGERY

## 2025-02-06 PROCEDURE — 99213 OFFICE O/P EST LOW 20 MIN: CPT | Performed by: ORTHOPAEDIC SURGERY

## 2025-02-06 PROCEDURE — 99213 OFFICE O/P EST LOW 20 MIN: CPT | Mod: 25 | Performed by: ORTHOPAEDIC SURGERY

## 2025-02-06 RX ORDER — LIDOCAINE HYDROCHLORIDE 10 MG/ML
0.5 INJECTION, SOLUTION INFILTRATION; PERINEURAL
Status: COMPLETED | OUTPATIENT
Start: 2025-02-06 | End: 2025-02-06

## 2025-02-06 RX ORDER — TRIAMCINOLONE ACETONIDE 40 MG/ML
20 INJECTION, SUSPENSION INTRA-ARTICULAR; INTRAMUSCULAR
Status: COMPLETED | OUTPATIENT
Start: 2025-02-06 | End: 2025-02-06

## 2025-02-06 RX ADMIN — TRIAMCINOLONE ACETONIDE 20 MG: 40 INJECTION, SUSPENSION INTRA-ARTICULAR; INTRAMUSCULAR at 14:42

## 2025-02-06 RX ADMIN — LIDOCAINE HYDROCHLORIDE 0.5 ML: 10 INJECTION, SOLUTION INFILTRATION; PERINEURAL at 14:42

## 2025-02-06 ASSESSMENT — PAIN - FUNCTIONAL ASSESSMENT: PAIN_FUNCTIONAL_ASSESSMENT: 0-10

## 2025-02-06 ASSESSMENT — PAIN DESCRIPTION - DESCRIPTORS: DESCRIPTORS: ACHING;SORE

## 2025-02-06 ASSESSMENT — PAIN SCALES - GENERAL: PAINLEVEL_OUTOF10: 4

## 2025-02-06 NOTE — PROGRESS NOTES
[facility]  Hand and Upper Extremity Service  Follow up visit         New Problem: Right thumb    Interval History: She returns for her right thumb. She was previously seen for a biceps tendon issue and some thumb arthritis about one year ago. Over the last couple of weeks she's started to notice pain and swelling at her thumb with clicking. She has difficulty with pinching activities like using scissors. She hasn't had any formal workup or treatment for these new symptoms.               Past medical history, medications, allergies, surgical history and review of systems are reviewed and otherwise unchanged when compared to last visit on 1/11/24         Examination:  Constitutional: Oriented to person, place, and time.  Appears well-developed and well-nourished.  Head: Normocephalic and atraumatic.  Eyes: Pupils are equal, round, and reactive to light.  Cardiovascular: Intact distal pulses.  Pulmonary/Chest/Breast: Effort normal. No respiratory distress.  Neurological: Alert and oriented to person, place, and time.  Skin: Skin is warm and dry.  Psychiatric: normal mood and affect.  Behavior is normal.  Musculoskeletal: Right hand reveals swelling along flexor surface of thumb. Focal tenderness over A1 pulley and triggering with active thumb IP joint flexion. Minimal discomfort with palpation of thumb CMC joint.       Personal Interpretation of Diagnostic studies: X-rays of right hand obtained today demonstrate mild joint space narrowing of thumb CMC joint but no other significant abnormalities.        Impression: Right thumb trigger finger       Plan: We discussed treatment options and recommendations are for steroid injection which she has agreed to.       In Office Procedures Performed: Right thumb trigger injection   Hand / UE Inj/Asp: R thumb A1 for trigger finger on 2/6/2025 2:42 PM  Indications: tendon swelling and pain  Details: 25 G needle, volar approach  Medications: 0.5  mL lidocaine 10 mg/mL (1 %); 20 mg triamcinolone acetonide 40 mg/mL  Outcome: tolerated well, no immediate complications  Procedure, treatment alternatives, risks and benefits explained, specific risks discussed. Consent was given by the patient. Immediately prior to procedure a time out was called to verify the correct patient, procedure, equipment, support staff and site/side marked as required. Patient was prepped and draped in the usual sterile fashion.             Follow up: As needed             Antwan Jenkins MD  [facility]  Department of Orthopaedic Surgery  Hand and Upper Extremity Reconstruction    Scribe Attestation  By signing my name below, IFederico , Scribector   attest that this documentation has been prepared under the direction and in the presence of Dr. Antwan Jenkins.    Dictation performed with the use of voice recognition software.  Syntax and grammatical errors may exist.

## 2025-02-12 ENCOUNTER — CLINICAL SUPPORT (OUTPATIENT)
Dept: ALLERGY | Facility: CLINIC | Age: 70
End: 2025-02-12
Payer: COMMERCIAL

## 2025-02-12 DIAGNOSIS — J30.2 SEASONAL ALLERGIES: ICD-10-CM

## 2025-02-12 DIAGNOSIS — Z91.038 ALLERGY TO INSECT VENOM: ICD-10-CM

## 2025-02-12 PROCEDURE — 95117 IMMUNOTHERAPY INJECTIONS: CPT | Performed by: ALLERGY & IMMUNOLOGY

## 2025-02-12 PROCEDURE — 95149 ANTIGEN THERAPY SERVICES: CPT | Performed by: ALLERGY & IMMUNOLOGY

## 2025-02-15 DIAGNOSIS — J30.81 ALLERGIC RHINITIS DUE TO CATS: ICD-10-CM

## 2025-02-16 DIAGNOSIS — J30.81 ALLERGIC RHINITIS DUE TO CATS: ICD-10-CM

## 2025-02-16 RX ORDER — DESLORATADINE AND PSEUDOEPHEDRINE SULFATE 2.5; 12 MG/1; MG/1
1 TABLET, EXTENDED RELEASE ORAL EVERY 12 HOURS PRN
Qty: 60 TABLET | Refills: 0 | OUTPATIENT
Start: 2025-02-16

## 2025-02-16 RX ORDER — DESLORATADINE AND PSEUDOEPHEDRINE SULFATE 2.5; 12 MG/1; MG/1
1 TABLET, EXTENDED RELEASE ORAL EVERY 12 HOURS PRN
Qty: 60 TABLET | Refills: 3 | Status: SHIPPED | OUTPATIENT
Start: 2025-02-16 | End: 2026-02-16

## 2025-02-19 ENCOUNTER — CLINICAL SUPPORT (OUTPATIENT)
Dept: ALLERGY | Facility: CLINIC | Age: 70
End: 2025-02-19
Payer: COMMERCIAL

## 2025-02-19 DIAGNOSIS — Z91.038 ALLERGY TO INSECT VENOM: ICD-10-CM

## 2025-02-19 DIAGNOSIS — J30.2 SEASONAL ALLERGIES: ICD-10-CM

## 2025-02-19 PROCEDURE — 95149 ANTIGEN THERAPY SERVICES: CPT | Performed by: ALLERGY & IMMUNOLOGY

## 2025-02-19 PROCEDURE — 95117 IMMUNOTHERAPY INJECTIONS: CPT | Performed by: ALLERGY & IMMUNOLOGY

## 2025-02-26 PROCEDURE — RXMED WILLOW AMBULATORY MEDICATION CHARGE

## 2025-02-27 DIAGNOSIS — Z11.59 MEASLES SCREENING: Primary | ICD-10-CM

## 2025-02-27 DIAGNOSIS — Z11.59 NEED FOR HEPATITIS B SCREENING TEST: ICD-10-CM

## 2025-02-27 NOTE — PROGRESS NOTES
Patient has left foot injury and wants podiatry referral.  Placed  Also added measles Igg and hep B surface antibody phlebotomy

## 2025-03-04 DIAGNOSIS — I10 HYPERTENSION, UNSPECIFIED TYPE: ICD-10-CM

## 2025-03-04 RX ORDER — OLMESARTAN MEDOXOMIL 5 MG/1
5 TABLET ORAL 2 TIMES DAILY
Qty: 180 TABLET | Refills: 3 | Status: SHIPPED | OUTPATIENT
Start: 2025-03-04 | End: 2026-03-04

## 2025-03-05 ENCOUNTER — PHARMACY VISIT (OUTPATIENT)
Dept: PHARMACY | Facility: CLINIC | Age: 70
End: 2025-03-05
Payer: COMMERCIAL

## 2025-03-07 ENCOUNTER — OFFICE VISIT (OUTPATIENT)
Dept: PODIATRY | Facility: CLINIC | Age: 70
End: 2025-03-07
Payer: COMMERCIAL

## 2025-03-07 ENCOUNTER — TELEPHONE (OUTPATIENT)
Dept: PRIMARY CARE | Facility: CLINIC | Age: 70
End: 2025-03-07

## 2025-03-07 VITALS — WEIGHT: 199 LBS | HEIGHT: 64 IN | BODY MASS INDEX: 33.97 KG/M2

## 2025-03-07 DIAGNOSIS — M21.42 PES PLANUS OF BOTH FEET: Primary | ICD-10-CM

## 2025-03-07 DIAGNOSIS — M21.41 PES PLANUS OF BOTH FEET: Primary | ICD-10-CM

## 2025-03-07 DIAGNOSIS — L84 CORNS AND CALLOSITIES: ICD-10-CM

## 2025-03-07 PROCEDURE — 1159F MED LIST DOCD IN RCRD: CPT | Performed by: PODIATRIST

## 2025-03-07 PROCEDURE — 1160F RVW MEDS BY RX/DR IN RCRD: CPT | Performed by: PODIATRIST

## 2025-03-07 PROCEDURE — 3008F BODY MASS INDEX DOCD: CPT | Performed by: PODIATRIST

## 2025-03-07 PROCEDURE — RXMED WILLOW AMBULATORY MEDICATION CHARGE

## 2025-03-07 PROCEDURE — 1036F TOBACCO NON-USER: CPT | Performed by: PODIATRIST

## 2025-03-07 PROCEDURE — 99202 OFFICE O/P NEW SF 15 MIN: CPT | Performed by: PODIATRIST

## 2025-03-07 NOTE — TELEPHONE ENCOUNTER
CHARLA PARSONLEEROY  [phone]  PER: AYESHA LAWSONGuillermo  CPT  X2  DX M21.41 AND M21.42 ARE COVERED BASED ON MEDICAL NECESSITY (WOULDN'T TAKE DX CODES)  PLAN PAYS AT 85% OF THE ALLOWED AMT, UP UNTIL PATIENT MEETS $1600 INDIVIDUAL OUT OF POCKET EXPENSE. AT THIS TIME, $479.75 HAS BEEN ACCUMULATED. ONCE THEY WHICH THE $1600 INDIVIDUAL OUT OF POCKET EXPENSE, PLAN WILL PAY % OF THE ALLOWED AMT.  NO DEDUCTIBLE APPLIES  UNLIMITED, BASED ON MEDICAL NECESSITY  PRIOR AUTHORIZATION IS REQUIRED IF COST IS OVER $500. FAX [phone]. PHONE [phone].  REFERENCE #[id number]    FAXED PRIOR AUTHORIZATION REQUEST ALONG WITH PROGRESS NOTES AND ALL PERTINENT INFO.  WILL WAIT TO HEAR.  AUTHORIZATION-PENDING.

## 2025-03-07 NOTE — TELEPHONE ENCOUNTER
----- Message from Delvin Altman sent at 3/7/2025  9:16 AM EST -----  Regarding: orthotics  Please check orthotics dx is tricia shi.

## 2025-03-07 NOTE — PROGRESS NOTES
CC: callus left foot and flat feet    HPI:  Patient seen as new pt with complaint of callus left foot, use otc salicylic acid and callus patch, callus came off with the pad, also has flat feet.    PCP: Dr. Hayward  Last visit: 12-12-24     PMH  Past Medical History:   Diagnosis Date    Abnormal levels of other serum enzymes 12/24/2019    Elevated CPK    Acute frontal sinusitis, unspecified 02/02/2016    Acute frontal sinusitis    Acute upper respiratory infection, unspecified 06/10/2018    URTI (acute upper respiratory infection)    Acute vaginitis 09/04/2015    Vulvovaginitis, estrogen deficient    Allergic     Allergic rhinitis due to pollen 04/26/2016    Allergic rhinitis due to pollen    Allergy status to unspecified drugs, medicaments and biological substances 08/18/2016    History of allergic reaction    Anaphylactic shock, unspecified, initial encounter 08/26/2015    Anaphylaxis    Asymptomatic menopausal state 09/30/2016    Menopause    Cellulitis of unspecified part of limb 07/08/2019    Cellulitis of arm    Combined forms of age-related cataract, right eye 03/09/2022    Combined form of age-related cataract, right eye    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    Facial asymmetry    Cough, unspecified 12/22/2014    Cough    Dietary counseling and surveillance 05/26/2018    Encounter for weight loss counseling    Disease of thyroid gland     Elevated blood-pressure reading, without diagnosis of hypertension 04/21/2017    Borderline hypertension    Encounter for gynecological examination (general) (routine) without abnormal findings 09/30/2016    Encounter for routine gynecological examination    Encounter for gynecological examination (general) (routine) without abnormal findings 09/04/2015    Encounter for routine pelvic examination    Encounter for other screening for malignant neoplasm of breast 12/19/2019    Breast cancer screening    Encounter for screening for malignant neoplasm of colon 12/07/2018     Screen for colon cancer    Encounter for screening for osteoporosis 10/01/2020    Osteoporosis screening    Encounter for screening for respiratory tuberculosis 03/22/2016    Tuberculosis screening    Encounter for screening mammogram for malignant neoplasm of breast 10/27/2016    Visit for screening mammogram    Encounter for screening mammogram for malignant neoplasm of breast 09/04/2015    Visit for screening mammogram    Encounter for therapeutic drug level monitoring 12/17/2019    Medication monitoring encounter    Heart murmur     Hyperlipidemia     Hypertension     Lichen sclerosus et atrophicus 04/21/2017    Lichen sclerosus et atrophicus    Localized swelling, mass and lump, head 01/10/2020    Localized swelling, mass and lump, head    Nontoxic single thyroid nodule 03/21/2019    Solitary thyroid nodule    Other allergy status, other than to drugs and biological substances     History of environmental allergies    Other hemoglobinopathies (CMS-HCC) 07/29/2019    Elevated hemoglobin    Other specified disorders of bone, unspecified site 02/22/2018    Bony prominence    Other specified personal risk factors, not elsewhere classified 09/04/2015    At risk for osteopenia    Other specified soft tissue disorders 11/30/2016    Calf swelling    Pain in left hand 03/04/2021    Pain of left hand    Personal history of other diseases of the circulatory system     History of hypertension    Personal history of other diseases of the nervous system and sense organs 06/11/2021    History of conjunctivitis    Personal history of other diseases of the respiratory system 04/01/2020    History of bronchitis    Personal history of other diseases of the respiratory system 12/31/2018    History of acute bronchitis with bronchospasm    Personal history of other drug therapy 10/09/2019    History of influenza vaccination    Personal history of other drug therapy 03/30/2022    History of influenza vaccination    Personal history  of other endocrine, nutritional and metabolic disease     History of high cholesterol    Personal history of other endocrine, nutritional and metabolic disease     History of thyroid disorder    Personal history of other medical treatment 10/11/2017    History of screening mammography    Personal history of other specified conditions 04/14/2022    History of epistaxis    Personal history of other specified conditions     History of snoring    Personal history of other specified conditions 04/21/2017    History of edema    Polyp of cervix uteri 09/04/2015    Endocervical polyp    Postnasal drip 02/28/2022    Post-nasal drainage    Preglaucoma, unspecified, unspecified eye 04/21/2017    Borderline glaucoma (glaucoma suspect)    Rash and other nonspecific skin eruption 10/12/2015    Rash    Sprain of unspecified site of right knee, initial encounter 08/26/2014    Sprain of right knee    Toxic effect of venom of hornets, accidental (unintentional), initial encounter 05/26/2016    Sting from hornet, wasp, or bee    Unspecified disorder of refraction 04/21/2017    Refraction error    Unspecified injury of unspecified lower leg, initial encounter 03/21/2019    Knee injury    Unspecified injury of unspecified wrist, hand and finger(s), initial encounter 04/21/2017    Finger injury     MEDS    Current Outpatient Medications:     alirocumab (Praluent Pen) 75 mg/mL pen injector, INJECT 75MG (1 PEN) UNDER THE SKIN ONCE EVERY 2 WEEKS AS DIRECTED, Disp: 6 mL, Rfl: 1    azelastine (Optivar) 0.05 % ophthalmic solution, Administer 1 drop into both eyes 2 times a day. PRN itching/allergies, Disp: 6 mL, Rfl: 11    cholecalciferol (Vitamin D-3) 5,000 Units tablet, Take 1 tablet (5,000 Units) by mouth once daily., Disp: , Rfl:     clindamycin (Cleocin T) 1 % gel, Apply topically 2 times a day., Disp: , Rfl:     clobetasol (Temovate) 0.05 % ointment, Apply topically 2 times a day. After applying topically once or twice daily for a week,  then use a small amount sparingly twice weekly., Disp: 15 g, Rfl: 2    desloratadine-pseudoephedrine (Clarinex-D 12 HOUR) 2.5-120 mg 12 hr tablet, Take 1 tablet by mouth every 12 hours if needed (allergy symptoms)., Disp: 60 tablet, Rfl: 3    doxycycline (Periostat) 20 mg tablet, Take 1 tablet (20 mg) by mouth once daily., Disp: , Rfl:     EPINEPHrine (Auvi-Q) 0.3 mg/0.3 mL injection syringe, inject intramuscularly as directed for anaphylaxis, Disp: 1 each, Rfl: 0    estradiol (Estrace) 0.01 % (0.1 mg/gram) vaginal cream, Insert 0.125 Applicatorfuls (0.5 g) into the vagina 3 times a week. Use a pea-sized amount (about 0.125 applicatorful) at the vaginal opening 3 times a week., Disp: 42.5 g, Rfl: 3    folic acid/vit B complex and C (B complex-vitamin C-folic acid) 400 mcg tablet extended release, Take 1 tablet by mouth once daily., Disp: , Rfl:     hydroCHLOROthiazide (HYDRODiuril) 25 mg tablet, Take 1 tablet (25 mg) by mouth once daily., Disp: 90 tablet, Rfl: 3    metFORMIN (Glucophage) 500 mg tablet, Take 2 tablets (1,000 mg) by mouth once daily., Disp: 180 tablet, Rfl: 3    olmesartan (Benicar) 5 mg tablet, Take 1 tablet (5 mg) by mouth 2 times a day., Disp: 180 tablet, Rfl: 3    sulfacetamide suspension (Klaron) 10 % suspension topical, Apply 1 Application topically 2 times a day., Disp: 118 mL, Rfl: 3  Allergies  Allergies   Allergen Reactions    Lisinopril Cough    Prochlorperazine Hives    Ramipril Cough    Statins-Hmg-Coa Reductase Inhibitors Myalgia    Cephalexin Hives and Rash    Povidone-Iodine Rash     Social History     Socioeconomic History    Marital status:    Tobacco Use    Smoking status: Never     Passive exposure: Never    Smokeless tobacco: Never   Substance and Sexual Activity    Alcohol use: Yes     Comment: 1/2 glass wine or 1/2 beer every other week    Drug use: Never    Sexual activity: Yes     Partners: Male     Birth control/protection: Post-menopausal     Family History   Problem  Relation Name Age of Onset    Colon cancer Mother Tori Monheim     Diabetes Mother Tori Monheim     Hypertension Mother Tori Monheim     Hyperlipidemia Mother Tori Monheim     Colon polyps Mother Tori Monheim     Arthritis Mother Tori Monheim     Hearing loss Mother Tori Monheim     Heart disease Mother Tori Monheim     Diabetes type II Mother Tori Mujicaheim     Heart murmur Mother Tori Monheim     Heart disease Father Osito Monheim     Hypertension Father Osito Mujicaheim     Hyperlipidemia Father Osito Mujicaheim     Accidental death Father Osito Monheim     Hyperlipidemia Sister Saloni Amstutz     Hyperlipidemia Brother Osito LITTLE Monheim     Colon polyps Brother Osito LITTLE Monheim     Heart murmur Brother Osito LITTLE Monheim     Arthritis Son Omer Feiclarence     Diabetes Maternal Grandmother Emilee     Sudden death Maternal Grandfather celina Philip     Early natural death Maternal Grandfather celina Philip     Accidental death Maternal Grandfather celina Philip     Colon cancer Mother's Sister      Diabetes Mother's Sister Jasmyn Frasca     Heart disease Mother's Sister Jasmyn Frasca     Diabetes Mother's Sister Shilpa     Colon polyps Mother's Sister Shilpa     Hearing loss Mother's Sister Shilpa     Heart disease Mother's Sister Shilpa     Diabetes type II Mother's Sister Shilpa     Colon cancer Other      Breast cancer Paternal Cousin  40 - 49     Past Surgical History:   Procedure Laterality Date    ADENOIDECTOMY  1960    BREAST BIOPSY  remote     SECTION, CLASSIC  2016     Section     SECTION, LOW TRANSVERSE  ,     EYE SURGERY      KNEE SURGERY  2016    Knee Surgery    OTHER SURGICAL HISTORY  2022    Thyroid biopsy    OTHER SURGICAL HISTORY  2022    Adenoidectomy    TONSILLECTOMY  2016    Tonsillectomy    WISDOM TOOTH EXTRACTION  1979       REVIEW OF SYSTEMS    + as noted above in HPI.       Physical examination:   On General Observation: Patient is a pleasant,  "cooperative, well developed 69 y.o.  adult female. The patient is alert and oriented to time, place and person. Patient has normal affect and mood.  Ht 1.626 m (5' 4\")   Wt 90.3 kg (199 lb)   BMI 34.16 kg/m²     Vascular:  DP and PT pulses are 2/4 b/l.  no edema noted. mild varicosities b/l.  CFT 6 seconds to all digits bilateral.  Skin temperature is warm to warm from proximal to distal bilateral.      Muscular: Strength is 5/5 b/l.  Motor strength is normal and symmetric proximally, as well as distally, in all four extremities. Good mobility of all extremities.  Tenderness to left plantar foot.     Neuro:  Proprioception present.   Sensation to vibration is  present. Protective sensation intact  at all pedal sites via Blum Frieda 5.07 monofilament bilateral.  Light touch present bilateral.     Derm:  No rashes, lesions, or ecchymosis.  Small hyperkeratotic plug present plantar left foot.    Ortho:  ROM is stable 1st mpj, mtj, atj, aj.  Loss of the medial arch is present        ASSESSMENT:    Pes planus  IPK left foot     PLAN:   Consult  A comprehensive history and physical examination were preformed. The patient was educated on clinical findings, diagnosis and treatment plans. Patient understands all that has been explained and all questions were answered to apparent satisfaction.   -Reviewed etiology of the above and treatment options, recommend orthotics to address the pes planus.  -Did debride small Ipk left plantar foot, no complications, did review recurrence possibility, recommend aperture pad with the orthotic.      Delvin Altman DPM      "

## 2025-03-10 ENCOUNTER — PHARMACY VISIT (OUTPATIENT)
Dept: PHARMACY | Facility: CLINIC | Age: 70
End: 2025-03-10
Payer: COMMERCIAL

## 2025-03-10 NOTE — TELEPHONE ENCOUNTER
PLEASE SEND DR. INGRID LUGO A MESSAGE PER HER REQUEST, THROUGH [employer]. INFORM HER THAT ORTHOTICS HAVE BEEN DENIED AND THAT SHE MAY WANT TO SCHEDULE AN APPT WITH YOU TO DISCUSS HER OPTIONS. THANK YOU!    3/7/25  RECEIVED FAX FROM HOLLY ANSARI. CPT  X2  DX M21.41 AMD M21.42 HAVE BEEN DENIED. dOES NOT MEET MEDICAL NECESSITY CRITERIA.   DENIAL #[id number]. A LETTER HAS BEEN SENT TO THE PATIENT.

## 2025-03-12 PROCEDURE — RXMED WILLOW AMBULATORY MEDICATION CHARGE

## 2025-03-19 ENCOUNTER — PHARMACY VISIT (OUTPATIENT)
Dept: PHARMACY | Facility: CLINIC | Age: 70
End: 2025-03-19
Payer: COMMERCIAL

## 2025-03-19 ENCOUNTER — CLINICAL SUPPORT (OUTPATIENT)
Dept: ALLERGY | Facility: CLINIC | Age: 70
End: 2025-03-19
Payer: COMMERCIAL

## 2025-03-19 DIAGNOSIS — J30.2 SEASONAL ALLERGIES: ICD-10-CM

## 2025-03-19 DIAGNOSIS — Z91.038 ALLERGY TO INSECT VENOM: ICD-10-CM

## 2025-03-19 PROCEDURE — 95149 ANTIGEN THERAPY SERVICES: CPT | Performed by: ALLERGY & IMMUNOLOGY

## 2025-03-19 PROCEDURE — 95117 IMMUNOTHERAPY INJECTIONS: CPT | Performed by: ALLERGY & IMMUNOLOGY

## 2025-03-20 ENCOUNTER — OFFICE VISIT (OUTPATIENT)
Dept: ORTHOPEDIC SURGERY | Facility: CLINIC | Age: 70
End: 2025-03-20
Payer: COMMERCIAL

## 2025-03-20 VITALS — BODY MASS INDEX: 33.97 KG/M2 | HEIGHT: 64 IN | WEIGHT: 199 LBS

## 2025-03-20 DIAGNOSIS — M79.645 FINGER PAIN, LEFT: Primary | ICD-10-CM

## 2025-03-20 PROCEDURE — 1036F TOBACCO NON-USER: CPT | Performed by: ORTHOPAEDIC SURGERY

## 2025-03-20 PROCEDURE — 99213 OFFICE O/P EST LOW 20 MIN: CPT | Performed by: ORTHOPAEDIC SURGERY

## 2025-03-20 PROCEDURE — 1159F MED LIST DOCD IN RCRD: CPT | Performed by: ORTHOPAEDIC SURGERY

## 2025-03-20 PROCEDURE — 3008F BODY MASS INDEX DOCD: CPT | Performed by: ORTHOPAEDIC SURGERY

## 2025-03-20 NOTE — PROGRESS NOTES
Nicole presents today for new problem.  Last seen in early February for triggering of the right thumb which resolved with injection.  About 12 days ago she was leaving the attic of her house and got a splinter into the radial aspect of her left ring finger by the DIP joint on the door frame.  She remove the splinter but the puncture wound was slow to heal in part because of her dry skin.  As she attempted to move her finger the skin Cracking and then over the last couple of days she started to notice increased swelling and pain and was concerned for infection requesting an appointment today.  However she reports that this morning her symptoms seem to be better than they were yesterday when she contacted me.  She denies fevers chills or constitutional symptoms.    Past medical history, medications, allergies, surgical history and review of systems have been reviewed with the patient. Pertinent changes are documented in the HPI. Otherwise they are unchanged when compared to last visit on February 6, 2025.    Physical Examination Findings:  Constitutional: Appears well-developed and well-nourished.  Head: Normocephalic and atraumatic.  Eyes: Pupils are equal and round.  Cardiovascular: Intact distal pulses.   Respiratory: Effort normal. No respiratory distress.  Neurologic: Alert and oriented to person, place, and time.  Skin: Skin is warm and dry.  Hematologic / Lymphatic: No lymphedema, lymphangitis.  Psychiatric: normal mood and affect. Behavior is normal.   Musculoskeletal: Left hand examination with minimal degenerative changes.  There is subtle swelling around the terminal aspect of the left ring finger.  She is tender to palpation along the radial border of the ring finger at the DIP joint but there is no fluctuance erythema induration or warmth or appreciable retained foreign body.  She has good MP and PIP joint range of motion.  No tenderness along the flexor tendon sheath.  She is a bit apprehensive to attempt  DIP joint flexion past about 30 degrees.    No images were obtained today.    Impression: Resolving cellulitis left ring finger.    Plan: I think her finger looks pretty benign.  We have given her instructions regarding management of edema and facilitation of range of motion active and passive.  I do not think there is any reason to put her on antibiotics at this time based on her clinical appearance but this is something that she will watch carefully and if she has concerns regarding potential infection getting worse in the next couple of days she will contact me so we can send an antibiotic to her pharmacy.    Return as needed for recurrence or progression of problems .    nAtwan Jenkins MD    [employer] [employer]  Department of Orthopaedic Surgery  Chief of Hand and Upper Extremity Surgery  [facility]    Dictation performed with the use of voice recognition software. Syntax and grammatical errors may exist.

## 2025-03-26 ENCOUNTER — CLINICAL SUPPORT (OUTPATIENT)
Dept: ALLERGY | Facility: CLINIC | Age: 70
End: 2025-03-26
Payer: COMMERCIAL

## 2025-03-26 DIAGNOSIS — J30.2 SEASONAL ALLERGIES: ICD-10-CM

## 2025-03-26 DIAGNOSIS — Z91.038 ALLERGY TO INSECT VENOM: ICD-10-CM

## 2025-03-26 PROCEDURE — 95117 IMMUNOTHERAPY INJECTIONS: CPT | Performed by: ALLERGY & IMMUNOLOGY

## 2025-03-26 PROCEDURE — RXMED WILLOW AMBULATORY MEDICATION CHARGE

## 2025-03-26 PROCEDURE — 95149 ANTIGEN THERAPY SERVICES: CPT | Performed by: ALLERGY & IMMUNOLOGY

## 2025-03-28 ENCOUNTER — APPOINTMENT (OUTPATIENT)
Dept: OPHTHALMOLOGY | Facility: CLINIC | Age: 70
End: 2025-03-28
Payer: COMMERCIAL

## 2025-04-01 ENCOUNTER — PHARMACY VISIT (OUTPATIENT)
Dept: PHARMACY | Facility: CLINIC | Age: 70
End: 2025-04-01
Payer: COMMERCIAL

## 2025-04-09 ENCOUNTER — CLINICAL SUPPORT (OUTPATIENT)
Dept: ALLERGY | Facility: CLINIC | Age: 70
End: 2025-04-09
Payer: COMMERCIAL

## 2025-04-09 DIAGNOSIS — Z91.038 ALLERGY TO INSECT VENOM: ICD-10-CM

## 2025-04-09 DIAGNOSIS — J30.2 SEASONAL ALLERGIES: ICD-10-CM

## 2025-04-09 PROCEDURE — 95117 IMMUNOTHERAPY INJECTIONS: CPT | Performed by: ALLERGY & IMMUNOLOGY

## 2025-04-09 PROCEDURE — 95149 ANTIGEN THERAPY SERVICES: CPT | Performed by: ALLERGY & IMMUNOLOGY

## 2025-04-16 ENCOUNTER — CLINICAL SUPPORT (OUTPATIENT)
Dept: ALLERGY | Facility: CLINIC | Age: 70
End: 2025-04-16
Payer: COMMERCIAL

## 2025-04-16 DIAGNOSIS — Z91.038 ALLERGY TO INSECT VENOM: ICD-10-CM

## 2025-04-16 DIAGNOSIS — J30.2 SEASONAL ALLERGIES: ICD-10-CM

## 2025-04-16 PROCEDURE — 95117 IMMUNOTHERAPY INJECTIONS: CPT | Performed by: ALLERGY & IMMUNOLOGY

## 2025-04-16 PROCEDURE — 95149 ANTIGEN THERAPY SERVICES: CPT | Performed by: ALLERGY & IMMUNOLOGY

## 2025-04-18 LAB
HBV SURFACE AB SERPL IA-ACNC: 84 MIU/ML
MEV IGG SER IA-ACNC: >300 AU/ML

## 2025-04-19 ASSESSMENT — SLIT LAMP EXAM - LIDS
COMMENTS: GOOD POSITION
COMMENTS: GOOD POSITION

## 2025-04-19 ASSESSMENT — EXTERNAL EXAM - LEFT EYE: OS_EXAM: NORMAL

## 2025-04-19 ASSESSMENT — EXTERNAL EXAM - RIGHT EYE: OD_EXAM: NORMAL

## 2025-04-19 ASSESSMENT — CUP TO DISC RATIO
OD_RATIO: 0.5
OS_RATIO: 0.5

## 2025-04-19 NOTE — PROGRESS NOTES
CpcmegwmnwnG12.209  FyflvbjtsnJ32.4  -Uses progressives and computer glasses.   -New Rx given per patient request - optional to fill. Patient's signature obtained to acknowledge and confirm that a paper copy of glasses Rx was given to patient in compliance with [facility] Eyeglass Rule. Electronic copy of Rx will also be available via [employer]EP[employer].     Glaucoma suspect of both eyesH40.003  -No FH of glaucoma  -Increased cup to disc ratio  -Pachymetry (3/3/23) - 575/581.   -OCT RNFL (4/25/25) #1 - SS: 5/10 OD and 9/10 OS. OD: Thin S. OS: WNL. 80/82. Poor image capture OD.   -OCT RNFL (4/25/25) #2 - SS: 8/10 OD and 9/10 OS. OD: Bord S. OS: WNL. 84/84. Stable from 3/3/23.   -Low suspicion/low risk for glaucoma. No treatment necessary at this time. Monitor IOP carefully perioperatively. F/u 1 year - comprehensive exam and OCT RNFL.     Pseudophakia of left eyeZ96.1 - 6/9/22  Pseudophakia of right eyeZ96.1 - 4/21/22  -Doing well. Good vision. IOP good. Off all gtts.     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   -Dry eyes and allergic conjunctivitis  -Continue artificial tears PRN: Refresh, Systane, Theratears, Genteal, Blink  -Takes Clarinex. Receives immunotherapy. May use artificial tears daily and can use: Azelastine OU BID PRN itching/allergies.       No history of refractive surgery.   No FH of AMD/glaucoma

## 2025-04-23 ENCOUNTER — CLINICAL SUPPORT (OUTPATIENT)
Dept: ALLERGY | Facility: CLINIC | Age: 70
End: 2025-04-23
Payer: COMMERCIAL

## 2025-04-23 DIAGNOSIS — J30.2 SEASONAL ALLERGIES: ICD-10-CM

## 2025-04-23 DIAGNOSIS — Z91.038 ALLERGY TO INSECT VENOM: ICD-10-CM

## 2025-04-23 PROCEDURE — 95117 IMMUNOTHERAPY INJECTIONS: CPT | Performed by: ALLERGY & IMMUNOLOGY

## 2025-04-23 PROCEDURE — 95149 ANTIGEN THERAPY SERVICES: CPT | Performed by: ALLERGY & IMMUNOLOGY

## 2025-04-23 PROCEDURE — RXMED WILLOW AMBULATORY MEDICATION CHARGE

## 2025-04-25 ENCOUNTER — APPOINTMENT (OUTPATIENT)
Dept: OPHTHALMOLOGY | Facility: CLINIC | Age: 70
End: 2025-04-25
Payer: COMMERCIAL

## 2025-04-25 DIAGNOSIS — H40.003 GLAUCOMA SUSPECT OF BOTH EYES: ICD-10-CM

## 2025-04-25 DIAGNOSIS — H52.4 PRESBYOPIA: ICD-10-CM

## 2025-04-25 DIAGNOSIS — Z96.1 PSEUDOPHAKIA: ICD-10-CM

## 2025-04-25 DIAGNOSIS — H02.881 MEIBOMIAN GLAND DYSFUNCTION (MGD) OF UPPER EYELIDS OF BOTH EYES: ICD-10-CM

## 2025-04-25 DIAGNOSIS — H02.884 MEIBOMIAN GLAND DYSFUNCTION (MGD) OF UPPER EYELIDS OF BOTH EYES: ICD-10-CM

## 2025-04-25 DIAGNOSIS — H10.13 ALLERGIC CONJUNCTIVITIS OF BOTH EYES: ICD-10-CM

## 2025-04-25 DIAGNOSIS — H52.203 ASTIGMATISM OF BOTH EYES, UNSPECIFIED TYPE: Primary | ICD-10-CM

## 2025-04-25 PROCEDURE — 92015 DETERMINE REFRACTIVE STATE: CPT | Performed by: OPHTHALMOLOGY

## 2025-04-25 PROCEDURE — 92014 COMPRE OPH EXAM EST PT 1/>: CPT | Performed by: OPHTHALMOLOGY

## 2025-04-25 RX ORDER — AZELASTINE HYDROCHLORIDE 0.5 MG/ML
1 SOLUTION/ DROPS OPHTHALMIC 2 TIMES DAILY
Qty: 18 ML | Refills: 3 | Status: SHIPPED | OUTPATIENT
Start: 2025-04-25

## 2025-04-25 ASSESSMENT — VISUAL ACUITY
METHOD: SNELLEN - LINEAR
OD_CC: J1+
OS_CC: 20/20
CORRECTION_TYPE: GLASSES
OD_CC: 20/20
OS_CC: J1+

## 2025-04-25 ASSESSMENT — CONF VISUAL FIELD
OS_INFERIOR_NASAL_RESTRICTION: 0
OS_NORMAL: 1
OD_INFERIOR_NASAL_RESTRICTION: 0
OS_INFERIOR_TEMPORAL_RESTRICTION: 0
OS_SUPERIOR_TEMPORAL_RESTRICTION: 0
OD_SUPERIOR_TEMPORAL_RESTRICTION: 0
OS_SUPERIOR_NASAL_RESTRICTION: 0
OD_NORMAL: 1
OD_INFERIOR_TEMPORAL_RESTRICTION: 0
OD_SUPERIOR_NASAL_RESTRICTION: 0

## 2025-04-25 ASSESSMENT — ENCOUNTER SYMPTOMS
NEUROLOGICAL NEGATIVE: 0
ALLERGIC/IMMUNOLOGIC NEGATIVE: 0
MUSCULOSKELETAL NEGATIVE: 0
RESPIRATORY NEGATIVE: 0
GASTROINTESTINAL NEGATIVE: 0
EYES NEGATIVE: 1
CONSTITUTIONAL NEGATIVE: 0
CARDIOVASCULAR NEGATIVE: 0
ENDOCRINE NEGATIVE: 0
HEMATOLOGIC/LYMPHATIC NEGATIVE: 0
PSYCHIATRIC NEGATIVE: 0

## 2025-04-25 ASSESSMENT — REFRACTION_MANIFEST
OD_AXIS: 165
OD_ADD: +2.50
OS_AXIS: 005
OD_SPHERE: +0.50
OS_CYLINDER: -1.00
OS_ADD: +2.50
OS_SPHERE: +0.50
OD_CYLINDER: -0.75

## 2025-04-25 ASSESSMENT — TONOMETRY
OS_IOP_MMHG: 17
OD_IOP_MMHG: 18
IOP_METHOD: GOLDMANN APPLANATION

## 2025-04-25 ASSESSMENT — REFRACTION_WEARINGRX
OS_AXIS: 005
OS_SPHERE: +0.75
OS_ADD: +2.50
OD_SPHERE: +0.50
OD_AXIS: 165
OD_ADD: +2.50
OS_CYLINDER: -1.00
OD_CYLINDER: -0.50

## 2025-04-25 ASSESSMENT — PACHYMETRY
OD_CT(UM): 575
OS_CT(UM): 581

## 2025-04-30 ENCOUNTER — CLINICAL SUPPORT (OUTPATIENT)
Dept: ALLERGY | Facility: CLINIC | Age: 70
End: 2025-04-30
Payer: COMMERCIAL

## 2025-04-30 ENCOUNTER — PHARMACY VISIT (OUTPATIENT)
Dept: PHARMACY | Facility: CLINIC | Age: 70
End: 2025-04-30
Payer: COMMERCIAL

## 2025-04-30 DIAGNOSIS — J30.2 SEASONAL ALLERGIES: ICD-10-CM

## 2025-04-30 PROCEDURE — 95117 IMMUNOTHERAPY INJECTIONS: CPT | Performed by: ALLERGY & IMMUNOLOGY

## 2025-05-05 ENCOUNTER — OFFICE VISIT (OUTPATIENT)
Dept: OPHTHALMOLOGY | Facility: CLINIC | Age: 70
End: 2025-05-05
Payer: COMMERCIAL

## 2025-05-05 DIAGNOSIS — H52.203 ASTIGMATISM OF BOTH EYES, UNSPECIFIED TYPE: ICD-10-CM

## 2025-05-05 DIAGNOSIS — H52.4 PRESBYOPIA: ICD-10-CM

## 2025-05-05 DIAGNOSIS — H35.411 LATTICE DEGENERATION OF RETINA, RIGHT: ICD-10-CM

## 2025-05-05 DIAGNOSIS — H10.13 ALLERGIC CONJUNCTIVITIS OF BOTH EYES: ICD-10-CM

## 2025-05-05 DIAGNOSIS — H02.881 MEIBOMIAN GLAND DYSFUNCTION (MGD) OF UPPER EYELIDS OF BOTH EYES: ICD-10-CM

## 2025-05-05 DIAGNOSIS — H40.003 GLAUCOMA SUSPECT OF BOTH EYES: ICD-10-CM

## 2025-05-05 DIAGNOSIS — H02.884 MEIBOMIAN GLAND DYSFUNCTION (MGD) OF UPPER EYELIDS OF BOTH EYES: ICD-10-CM

## 2025-05-05 DIAGNOSIS — H43.811 POSTERIOR VITREOUS DETACHMENT OF RIGHT EYE: Primary | ICD-10-CM

## 2025-05-05 DIAGNOSIS — Z96.1 PSEUDOPHAKIA: ICD-10-CM

## 2025-05-05 PROCEDURE — 92134 CPTRZ OPH DX IMG PST SGM RTA: CPT | Performed by: OPHTHALMOLOGY

## 2025-05-05 PROCEDURE — 99213 OFFICE O/P EST LOW 20 MIN: CPT | Performed by: OPHTHALMOLOGY

## 2025-05-05 ASSESSMENT — ENCOUNTER SYMPTOMS
CONSTITUTIONAL NEGATIVE: 0
GASTROINTESTINAL NEGATIVE: 0
ALLERGIC/IMMUNOLOGIC NEGATIVE: 0
NEUROLOGICAL NEGATIVE: 0
PSYCHIATRIC NEGATIVE: 0
EYES NEGATIVE: 1
HEMATOLOGIC/LYMPHATIC NEGATIVE: 0
MUSCULOSKELETAL NEGATIVE: 0
ENDOCRINE NEGATIVE: 0
RESPIRATORY NEGATIVE: 0
CARDIOVASCULAR NEGATIVE: 0

## 2025-05-05 ASSESSMENT — VISUAL ACUITY
OS_CC: 20/20
OD_CC: 20/20
CORRECTION_TYPE: GLASSES
METHOD: SNELLEN - LINEAR

## 2025-05-05 ASSESSMENT — EXTERNAL EXAM - LEFT EYE: OS_EXAM: NORMAL

## 2025-05-05 ASSESSMENT — CUP TO DISC RATIO
OS_RATIO: 0.5
OD_RATIO: 0.5

## 2025-05-05 ASSESSMENT — SLIT LAMP EXAM - LIDS
COMMENTS: GOOD POSITION
COMMENTS: GOOD POSITION

## 2025-05-05 ASSESSMENT — REFRACTION_WEARINGRX
OS_SPHERE: +0.75
OD_ADD: +2.50
OS_CYLINDER: -1.00
OS_AXIS: 005
OD_AXIS: 165
OD_SPHERE: +0.50
OS_ADD: +2.50
OD_CYLINDER: -0.50

## 2025-05-05 ASSESSMENT — EXTERNAL EXAM - RIGHT EYE: OD_EXAM: NORMAL

## 2025-05-05 ASSESSMENT — PACHYMETRY
OS_CT(UM): 581
OD_CT(UM): 575

## 2025-05-05 ASSESSMENT — TONOMETRY
OD_IOP_MMHG: 18
IOP_METHOD: GOLDMANN APPLANATION
OS_IOP_MMHG: 18

## 2025-05-05 NOTE — PROGRESS NOTES
Posterior vitreous detachment, right eye  Lattice degeneration, right eye  -Symptoms x 4-5 days. Saw a flash of light OD at start of symptoms.   -OCT macula (5/5/25) - SS: 10/10 OD and 9/10 OS. Normal thickness and contour OU. Intact EZ OU. No edema OU. 262/261. Stable from 3/9/22.   -No retinal tear/detachment seen. F/u 6-8 weeks for repeat dilated exam right eye. Discussed symptoms of a retinal tear/detachment and advised to call immediately if any flashes of light, increased floaters, shadow/curtain, or decreased vision.     DhtjiyjsdkgD90.209  FoxesscacvM61.4  -Uses progressives and computer glasses.   -Rx given and confirmation signature obtained 4/25/25    Glaucoma suspect of both eyesH40.003  -No FH of glaucoma  -Increased cup to disc ratio  -Pachymetry (3/3/23) - 575/581.   -OCT RNFL (4/25/25) #1 - SS: 5/10 OD and 9/10 OS. OD: Thin S. OS: WNL. 80/82. Poor image capture OD.   -OCT RNFL (4/25/25) #2 - SS: 8/10 OD and 9/10 OS. OD: Bord S. OS: WNL. 84/84. Stable from 3/3/23.   -Low suspicion/low risk for glaucoma. No treatment necessary at this time. Monitor IOP carefully perioperatively. F/u 1 year - comprehensive exam and OCT RNFL.     Pseudophakia of left eyeZ96.1 - 6/9/22  Pseudophakia of right eyeZ96.1 - 4/21/22  -Doing well. Good vision. IOP good. Off all gtts.     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   -Dry eyes and allergic conjunctivitis  -Continue artificial tears PRN: Refresh, Systane, Theratears, Genteal, Blink  -Takes Clarinex. Receives immunotherapy. May use artificial tears daily and can use: Azelastine OU BID PRN itching/allergies.       No history of refractive surgery.   No FH of AMD/glaucoma

## 2025-05-06 PROBLEM — R09.82 POST-NASAL DRAINAGE: Status: RESOLVED | Noted: 2023-11-29 | Resolved: 2025-05-06

## 2025-05-06 PROBLEM — J30.2 SEASONAL ALLERGIES: Status: RESOLVED | Noted: 2024-06-26 | Resolved: 2025-05-06

## 2025-05-06 PROBLEM — Z91.09 ENVIRONMENTAL ALLERGIES: Status: RESOLVED | Noted: 2023-11-29 | Resolved: 2025-05-06

## 2025-05-06 PROBLEM — J30.9 ALLERGIC SINUSITIS: Status: RESOLVED | Noted: 2024-10-30 | Resolved: 2025-05-06

## 2025-05-06 NOTE — PROGRESS NOTES
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    Chief Complaint   Patient presents with    URI      Patient presents for F/U of venom IT Started 7-.    Since last visit, 1-8-25, patient states she had COVID Dec 2024 though her sinus Sx started before that.  She has been sick more and had sinusitis Jan 2025.    Patient states last night she almost went to the ED.  On Thursday, she watched her 3 y/o granddaughter, who was sick with sore throat and low-grade fever.  Friday night, the patient woke up multiple times due to painful sore throat.  Her granddaughter is better but still sick and then her daughter, daughter's  and 15 m/o grandson all ended up sick by Friday also.  She notes the kids are improving.      Patient reports she developed a fever on Saturday, which is usually preceded by a stiff, painful neck.  She did a strep test at home that was negative.  She had onset of cough yesterday and last night, her temp reached 101.2.  She states she coughed so hard she almost developed stridor.  The phlegm was stuck in her throat and she could not bring it up.  She has Tessalon at home and requests a refill.  Her heart rate was elevated but returned to normal after Tylenol resolved her fever.  She notes she is always febrile when she is sick.      Patient then started having congestion on and off the last 48 hours.  She took her Clarinex D last night.  Today, she is C/O a sore throat, chest congestion and ear discomfort, which typically occurs with her sore throats.  She has SVT and PVCs off and on that worsen after she having Claritin.  She will also experience them if she has coffee so she usually drinks one cup.     Patient states she should be improving.  Patient believes she has parainfluenza because a severe sore throat typically accompanies the virus.  Patient has not used her inhaler she has at home, which she believes we  prescribed.    Patient was taking 40 mg of doxycycline daily for acne rosacea but has not taken it since Dec 2024.  She would like to restart it.  About 25 years ago, she had C. difficile after having Zithromax.  Patient was notified by her nurse she had COVID while in the office here.    Review of Systems   Constitutional:  Positive for fever.   HENT:  Positive for postnasal drip and sore throat.         Ear discomfort bilaterally.   Respiratory:  Positive for cough.         Chest congestion.     Physical Exam  Constitutional:       Appearance: Normal appearance.   HENT:      Head: Normocephalic and atraumatic.      Right Ear: External ear normal. There is no impacted cerumen.      Left Ear: External ear normal. There is no impacted cerumen.      Ears:      Comments: Bilateral retracted membranes.     Nose: No congestion or rhinorrhea.   Eyes:      Extraocular Movements: Extraocular movements intact.      Conjunctiva/sclera: Conjunctivae normal.      Pupils: Pupils are equal, round, and reactive to light.   Cardiovascular:      Rate and Rhythm: Regular rhythm. Tachycardia present.      Heart sounds: Murmur (systolic ejection) heard.      No friction rub. No gallop.      Comments: Skipped beats.  Pulmonary:      Effort: No respiratory distress.      Breath sounds: No wheezing, rhonchi or rales.      Comments: Coughing during exam and interview.  Skin:     General: Skin is warm and dry.   Neurological:      Mental Status: She is alert.   Psychiatric:         Mood and Affect: Mood normal.         Behavior: Behavior normal.     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    I recommended she carry Neffy epinephrine.     COVID  Patient has been ill since last week with cough, chest congestion, severe pharyngitis.  Strep test was negative.  While sitting in the office for this visit, patient's nurse announced that patient tested positive for COVID.    After the visit, I offered the  patient a prescription for Paxlovid, which she agreed to.    Acute cough      I would like her to complete a chest x-ray as well as Zithromax 500 mg daily.     She can continue Tessalon Perles 3 times a day as needed for cough.    Start Airsupra (budesonide-albuterol) 2 inhalations every 4 hours as needed.    By signing my name below, I, Alina Headibector, attest that this documentation has been prepared under the direction and in the presence of Malena Tamez MD.  All medical record entries made by the Scribe were at my direction and personally dictated by me. I have reviewed the chart and agree that the record accurately reflects my personal performance of the history, physical exam, discussion and plan.

## 2025-05-07 ENCOUNTER — OFFICE VISIT (OUTPATIENT)
Dept: ALLERGY | Facility: CLINIC | Age: 70
End: 2025-05-07
Payer: COMMERCIAL

## 2025-05-07 ENCOUNTER — HOSPITAL ENCOUNTER (OUTPATIENT)
Dept: RADIOLOGY | Facility: CLINIC | Age: 70
Discharge: HOME | End: 2025-05-07
Payer: COMMERCIAL

## 2025-05-07 DIAGNOSIS — R05.1 ACUTE COUGH: ICD-10-CM

## 2025-05-07 DIAGNOSIS — U07.1 COVID: ICD-10-CM

## 2025-05-07 DIAGNOSIS — Z91.038 ALLERGY TO INSECT VENOM: Primary | ICD-10-CM

## 2025-05-07 PROCEDURE — 1160F RVW MEDS BY RX/DR IN RCRD: CPT | Performed by: ALLERGY & IMMUNOLOGY

## 2025-05-07 PROCEDURE — 1159F MED LIST DOCD IN RCRD: CPT | Performed by: ALLERGY & IMMUNOLOGY

## 2025-05-07 PROCEDURE — 99214 OFFICE O/P EST MOD 30 MIN: CPT | Performed by: ALLERGY & IMMUNOLOGY

## 2025-05-07 PROCEDURE — 71046 X-RAY EXAM CHEST 2 VIEWS: CPT

## 2025-05-07 PROCEDURE — 71046 X-RAY EXAM CHEST 2 VIEWS: CPT | Performed by: RADIOLOGY

## 2025-05-07 RX ORDER — NIRMATRELVIR AND RITONAVIR 300-100 MG
3 KIT ORAL 2 TIMES DAILY
Qty: 30 TABLET | Refills: 0 | Status: SHIPPED | OUTPATIENT
Start: 2025-05-07 | End: 2025-05-12

## 2025-05-07 RX ORDER — DOXYCYCLINE HYCLATE 20 MG
40 TABLET ORAL DAILY
Qty: 180 TABLET | Refills: 3 | Status: SHIPPED | OUTPATIENT
Start: 2025-05-07 | End: 2026-05-07

## 2025-05-07 RX ORDER — BENZONATATE 200 MG/1
200 CAPSULE ORAL 3 TIMES DAILY PRN
Qty: 90 CAPSULE | Refills: 0 | Status: SHIPPED | OUTPATIENT
Start: 2025-05-07 | End: 2025-06-06

## 2025-05-07 RX ORDER — EPINEPHRINE 2 MG/100UL
1 SPRAY NASAL ONCE AS NEEDED
Qty: 2 EACH | Refills: 0 | Status: SHIPPED | OUTPATIENT
Start: 2025-05-07

## 2025-05-07 RX ORDER — AZITHROMYCIN 500 MG/1
500 TABLET, FILM COATED ORAL DAILY
Qty: 5 TABLET | Refills: 0 | Status: SHIPPED | OUTPATIENT
Start: 2025-05-07 | End: 2025-05-12

## 2025-05-07 ASSESSMENT — ENCOUNTER SYMPTOMS
COUGH: 1
FEVER: 1
SORE THROAT: 1

## 2025-05-07 NOTE — ASSESSMENT & PLAN NOTE
Patient has been ill since last week with cough, chest congestion, severe pharyngitis.  Strep test was negative.  While sitting in the office for this visit, patient's nurse announced that patient tested positive for COVID.    After the visit, I offered the patient a prescription for Paxlovid, which she agreed to.

## 2025-05-07 NOTE — ASSESSMENT & PLAN NOTE
I would like her to complete a chest x-ray as well as Zithromax 500 mg daily.     She can continue Tessalon Perles 3 times a day as needed for cough.    Start Airsupra (budesonide-albuterol) 2 inhalations every 4 hours as needed.

## 2025-05-07 NOTE — PATIENT INSTRUCTIONS
Complete chest x-ray.  I will follow up with you with results and further recommendations.     Start Zithromax at 500 mg daily.  This will not only offer antimicrobial, but also anti-inflammatory action in the lungs.    Continue Tessalon Perles 3 times a day as needed for cough.    Start Airsupra (budesonide-albuterol) 2 inhalations every 4 hours as needed.    Carry Neffy epinephrine with you.  If you get stung, take 2 Zyrtec tablets.  If you have?any reaction other than that at the sting site, use your Neffy. Avoid drinking out of open pop cans outdoors, walking barefoot and sitting by garbage cans at [place].     Continue doxycycline 40 daily for acne.    Follow up in 1 year or sooner if symptoms worsen prior.

## 2025-05-08 DIAGNOSIS — R05.1 ACUTE COUGH: Primary | ICD-10-CM

## 2025-05-08 RX ORDER — ALBUTEROL SULFATE AND BUDESONIDE 90; 80 UG/1; UG/1
2 AEROSOL, METERED RESPIRATORY (INHALATION) EVERY 4 HOURS PRN
Qty: 10.7 G | Refills: 0 | Status: SHIPPED | OUTPATIENT
Start: 2025-05-08 | End: 2026-05-08

## 2025-05-15 DIAGNOSIS — R05.1 ACUTE COUGH: ICD-10-CM

## 2025-05-15 RX ORDER — ALBUTEROL SULFATE AND BUDESONIDE 90; 80 UG/1; UG/1
2 AEROSOL, METERED RESPIRATORY (INHALATION) EVERY 4 HOURS PRN
Qty: 10.7 G | Refills: 0 | Status: SHIPPED | OUTPATIENT
Start: 2025-05-15 | End: 2026-05-15

## 2025-05-16 PROCEDURE — RXMED WILLOW AMBULATORY MEDICATION CHARGE

## 2025-05-21 ENCOUNTER — PHARMACY VISIT (OUTPATIENT)
Dept: PHARMACY | Facility: CLINIC | Age: 70
End: 2025-05-21
Payer: COMMERCIAL

## 2025-05-21 PROCEDURE — RXMED WILLOW AMBULATORY MEDICATION CHARGE

## 2025-05-27 DIAGNOSIS — J30.1 HAY FEVER: Primary | ICD-10-CM

## 2025-05-27 DIAGNOSIS — J30.89 PERENNIAL ALLERGIC RHINITIS: ICD-10-CM

## 2025-05-27 DIAGNOSIS — J30.81 ANIMAL DANDER ALLERGY: ICD-10-CM

## 2025-05-27 PROCEDURE — 95165 ANTIGEN THERAPY SERVICES: CPT | Performed by: ALLERGY & IMMUNOLOGY

## 2025-06-03 PROCEDURE — RXMED WILLOW AMBULATORY MEDICATION CHARGE

## 2025-06-04 ENCOUNTER — CLINICAL SUPPORT (OUTPATIENT)
Dept: ALLERGY | Facility: CLINIC | Age: 70
End: 2025-06-04
Payer: COMMERCIAL

## 2025-06-04 DIAGNOSIS — J30.2 SEASONAL ALLERGIES: ICD-10-CM

## 2025-06-04 DIAGNOSIS — T63.91XA ALLERGIC REACTION TO VENOM: ICD-10-CM

## 2025-06-04 DIAGNOSIS — Z91.038 ALLERGY TO INSECT VENOM: ICD-10-CM

## 2025-06-04 PROCEDURE — 95149 ANTIGEN THERAPY SERVICES: CPT | Performed by: ALLERGY & IMMUNOLOGY

## 2025-06-04 PROCEDURE — 95117 IMMUNOTHERAPY INJECTIONS: CPT | Performed by: ALLERGY & IMMUNOLOGY

## 2025-06-05 ENCOUNTER — PHARMACY VISIT (OUTPATIENT)
Dept: PHARMACY | Facility: CLINIC | Age: 70
End: 2025-06-05
Payer: COMMERCIAL

## 2025-06-18 ENCOUNTER — CLINICAL SUPPORT (OUTPATIENT)
Dept: ALLERGY | Facility: CLINIC | Age: 70
End: 2025-06-18
Payer: COMMERCIAL

## 2025-06-18 ENCOUNTER — SPECIALTY PHARMACY (OUTPATIENT)
Dept: PHARMACY | Facility: CLINIC | Age: 70
End: 2025-06-18

## 2025-06-18 DIAGNOSIS — J30.2 SEASONAL ALLERGIES: ICD-10-CM

## 2025-06-18 DIAGNOSIS — E78.5 HYPERLIPIDEMIA, UNSPECIFIED HYPERLIPIDEMIA TYPE: ICD-10-CM

## 2025-06-18 DIAGNOSIS — Z91.038 ALLERGY TO INSECT VENOM: ICD-10-CM

## 2025-06-18 PROCEDURE — RXMED WILLOW AMBULATORY MEDICATION CHARGE

## 2025-06-18 PROCEDURE — 95117 IMMUNOTHERAPY INJECTIONS: CPT | Performed by: ALLERGY & IMMUNOLOGY

## 2025-06-18 PROCEDURE — 95149 ANTIGEN THERAPY SERVICES: CPT | Performed by: ALLERGY & IMMUNOLOGY

## 2025-06-18 RX ORDER — ALIROCUMAB 75 MG/ML
INJECTION, SOLUTION SUBCUTANEOUS
Qty: 6 ML | Refills: 1 | Status: SHIPPED | OUTPATIENT
Start: 2025-06-18 | End: 2026-06-18

## 2025-06-20 ENCOUNTER — PHARMACY VISIT (OUTPATIENT)
Dept: PHARMACY | Facility: CLINIC | Age: 70
End: 2025-06-20
Payer: COMMERCIAL

## 2025-06-20 ASSESSMENT — EXTERNAL EXAM - RIGHT EYE: OD_EXAM: NORMAL

## 2025-06-20 ASSESSMENT — CUP TO DISC RATIO
OD_RATIO: 0.5
OS_RATIO: 0.5

## 2025-06-20 ASSESSMENT — SLIT LAMP EXAM - LIDS
COMMENTS: GOOD POSITION
COMMENTS: GOOD POSITION

## 2025-06-20 ASSESSMENT — EXTERNAL EXAM - LEFT EYE: OS_EXAM: NORMAL

## 2025-06-21 NOTE — PROGRESS NOTES
Posterior vitreous detachment, right eye  Vitreous floater, left eye  Lattice degeneration, right eye  -Symptoms began 5/2025 - flashes of light/floater  -OCT macula (5/5/25) - SS: 10/10 OD and 9/10 OS. Normal thickness and contour OU. Intact EZ OU. No edema OU. 262/261. Stable from 3/9/22.   -No retinal tear or detachment seen on exam. Retinal detachment symptoms discussed.   -F/u 1 year for comprehensive exam and OCT RNFL.     [id number]  XclhzgieyoZ45.4  -Uses progressives and computer glasses.   -Rx given and confirmation signature obtained 4/25/25.    Glaucoma suspect of both eyesH40.003  -No FH of glaucoma  -Increased cup to disc ratio  -Pachymetry (3/3/23) - 575/581.   -OCT RNFL (4/25/25) #1 - SS: 5/10 OD and 9/10 OS. OD: Thin S. OS: WNL. 80/82. Poor image capture OD.   -OCT RNFL (4/25/25) #2 - SS: 8/10 OD and 9/10 OS. OD: Bord S. OS: WNL. 84/84. Stable from 3/3/23.   -Low suspicion/low risk for glaucoma. No treatment necessary at this time. Monitor IOP carefully perioperatively. F/u 1 year - comprehensive exam and OCT RNFL.     Pseudophakia of left eyeZ96.1 - 6/9/22  Pseudophakia of right eyeZ96.1 - 4/21/22  -Doing well. Good vision. IOP good. Off all gtts.     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   -Dry eyes and allergic conjunctivitis  -Continue artificial tears PRN: Refresh, Systane, Theratears, Genteal, Blink  -Takes Clarinex. Receives immunotherapy. May use artificial tears daily and can use: Azelastine OU BID PRN itching/allergies.       No history of refractive surgery.   No FH of AMD/glaucoma

## 2025-06-25 ENCOUNTER — CLINICAL SUPPORT (OUTPATIENT)
Dept: ALLERGY | Facility: CLINIC | Age: 70
End: 2025-06-25
Payer: COMMERCIAL

## 2025-06-25 DIAGNOSIS — J30.1 ALLERGIC RHINITIS DUE TO POLLEN, UNSPECIFIED SEASONALITY: ICD-10-CM

## 2025-06-25 PROCEDURE — 95117 IMMUNOTHERAPY INJECTIONS: CPT | Performed by: ALLERGY & IMMUNOLOGY

## 2025-06-25 PROCEDURE — RXMED WILLOW AMBULATORY MEDICATION CHARGE

## 2025-06-27 ENCOUNTER — PHARMACY VISIT (OUTPATIENT)
Dept: PHARMACY | Facility: CLINIC | Age: 70
End: 2025-06-27
Payer: COMMERCIAL

## 2025-06-27 ENCOUNTER — APPOINTMENT (OUTPATIENT)
Dept: OPHTHALMOLOGY | Facility: CLINIC | Age: 70
End: 2025-06-27
Payer: COMMERCIAL

## 2025-06-27 DIAGNOSIS — H52.203 ASTIGMATISM OF BOTH EYES, UNSPECIFIED TYPE: ICD-10-CM

## 2025-06-27 DIAGNOSIS — H43.811 POSTERIOR VITREOUS DETACHMENT OF RIGHT EYE: Primary | ICD-10-CM

## 2025-06-27 DIAGNOSIS — H52.4 PRESBYOPIA: ICD-10-CM

## 2025-06-27 DIAGNOSIS — H35.411 LATTICE DEGENERATION OF RETINA, RIGHT: ICD-10-CM

## 2025-06-27 DIAGNOSIS — Z96.1 PSEUDOPHAKIA: ICD-10-CM

## 2025-06-27 DIAGNOSIS — H02.881 MEIBOMIAN GLAND DYSFUNCTION (MGD) OF UPPER EYELIDS OF BOTH EYES: ICD-10-CM

## 2025-06-27 DIAGNOSIS — H02.884 MEIBOMIAN GLAND DYSFUNCTION (MGD) OF UPPER EYELIDS OF BOTH EYES: ICD-10-CM

## 2025-06-27 DIAGNOSIS — H10.13 ALLERGIC CONJUNCTIVITIS OF BOTH EYES: ICD-10-CM

## 2025-06-27 DIAGNOSIS — H40.003 GLAUCOMA SUSPECT OF BOTH EYES: ICD-10-CM

## 2025-06-27 PROCEDURE — 99213 OFFICE O/P EST LOW 20 MIN: CPT | Performed by: OPHTHALMOLOGY

## 2025-06-27 ASSESSMENT — ENCOUNTER SYMPTOMS
CARDIOVASCULAR NEGATIVE: 0
NEUROLOGICAL NEGATIVE: 0
PSYCHIATRIC NEGATIVE: 0
ENDOCRINE NEGATIVE: 0
HEMATOLOGIC/LYMPHATIC NEGATIVE: 0
RESPIRATORY NEGATIVE: 0
MUSCULOSKELETAL NEGATIVE: 0
ALLERGIC/IMMUNOLOGIC NEGATIVE: 0
EYES NEGATIVE: 1
GASTROINTESTINAL NEGATIVE: 0
CONSTITUTIONAL NEGATIVE: 0

## 2025-06-27 ASSESSMENT — VISUAL ACUITY
METHOD: SNELLEN - LINEAR
OS_CC: 20/20
OD_CC: 20/20
CORRECTION_TYPE: GLASSES

## 2025-06-27 ASSESSMENT — REFRACTION_WEARINGRX
OS_CYLINDER: -0.50
OS_AXIS: 001
OD_AXIS: 163
OS_SPHERE: +0.50
OD_CYLINDER: -0.75
OS_ADD: +2.50
OD_SPHERE: +0.75
OD_ADD: +2.50

## 2025-06-27 ASSESSMENT — PACHYMETRY
OD_CT(UM): 575
OS_CT(UM): 581

## 2025-06-27 ASSESSMENT — CONF VISUAL FIELD
OS_NORMAL: 1
OD_INFERIOR_TEMPORAL_RESTRICTION: 0
OS_SUPERIOR_NASAL_RESTRICTION: 0
OD_SUPERIOR_TEMPORAL_RESTRICTION: 0
OD_NORMAL: 1
OS_INFERIOR_TEMPORAL_RESTRICTION: 0
OD_SUPERIOR_NASAL_RESTRICTION: 0
OD_INFERIOR_NASAL_RESTRICTION: 0
OS_SUPERIOR_TEMPORAL_RESTRICTION: 0
OS_INFERIOR_NASAL_RESTRICTION: 0

## 2025-06-27 ASSESSMENT — TONOMETRY
OD_IOP_MMHG: 16
OS_IOP_MMHG: 16
IOP_METHOD: GOLDMANN APPLANATION

## 2025-07-02 ENCOUNTER — PHARMACY VISIT (OUTPATIENT)
Dept: PHARMACY | Facility: CLINIC | Age: 70
End: 2025-07-02
Payer: COMMERCIAL

## 2025-07-02 PROCEDURE — RXMED WILLOW AMBULATORY MEDICATION CHARGE

## 2025-07-03 ENCOUNTER — APPOINTMENT (OUTPATIENT)
Dept: ALLERGY | Facility: CLINIC | Age: 70
End: 2025-07-03
Payer: COMMERCIAL

## 2025-07-09 ENCOUNTER — CLINICAL SUPPORT (OUTPATIENT)
Dept: ALLERGY | Facility: CLINIC | Age: 70
End: 2025-07-09
Payer: COMMERCIAL

## 2025-07-09 DIAGNOSIS — Z91.038 ALLERGY TO INSECT VENOM: ICD-10-CM

## 2025-07-09 DIAGNOSIS — J30.2 SEASONAL ALLERGIES: ICD-10-CM

## 2025-07-09 PROCEDURE — 95117 IMMUNOTHERAPY INJECTIONS: CPT | Performed by: ALLERGY & IMMUNOLOGY

## 2025-07-17 PROCEDURE — RXMED WILLOW AMBULATORY MEDICATION CHARGE

## 2025-07-18 ENCOUNTER — PHARMACY VISIT (OUTPATIENT)
Dept: PHARMACY | Facility: CLINIC | Age: 70
End: 2025-07-18
Payer: COMMERCIAL

## 2025-07-23 PROCEDURE — RXMED WILLOW AMBULATORY MEDICATION CHARGE

## 2025-07-28 ENCOUNTER — PHARMACY VISIT (OUTPATIENT)
Dept: PHARMACY | Facility: CLINIC | Age: 70
End: 2025-07-28
Payer: COMMERCIAL

## 2025-08-06 ENCOUNTER — CLINICAL SUPPORT (OUTPATIENT)
Dept: ALLERGY | Facility: CLINIC | Age: 70
End: 2025-08-06
Payer: COMMERCIAL

## 2025-08-06 DIAGNOSIS — Z91.038 ALLERGY TO INSECT VENOM: ICD-10-CM

## 2025-08-06 DIAGNOSIS — J30.1 ALLERGIC RHINITIS DUE TO POLLEN, UNSPECIFIED SEASONALITY: ICD-10-CM

## 2025-08-06 PROCEDURE — 95149 ANTIGEN THERAPY SERVICES: CPT | Performed by: ALLERGY & IMMUNOLOGY

## 2025-08-06 PROCEDURE — 95117 IMMUNOTHERAPY INJECTIONS: CPT | Performed by: ALLERGY & IMMUNOLOGY

## 2025-08-08 PROCEDURE — RXMED WILLOW AMBULATORY MEDICATION CHARGE

## 2025-08-11 ENCOUNTER — PHARMACY VISIT (OUTPATIENT)
Dept: PHARMACY | Facility: CLINIC | Age: 70
End: 2025-08-11
Payer: COMMERCIAL

## 2025-08-13 ENCOUNTER — CLINICAL SUPPORT (OUTPATIENT)
Dept: ALLERGY | Facility: CLINIC | Age: 70
End: 2025-08-13
Payer: COMMERCIAL

## 2025-08-13 DIAGNOSIS — J30.2 SEASONAL ALLERGIES: ICD-10-CM

## 2025-08-13 DIAGNOSIS — Z91.038 ALLERGY TO INSECT VENOM: ICD-10-CM

## 2025-08-13 PROCEDURE — 95117 IMMUNOTHERAPY INJECTIONS: CPT | Performed by: ALLERGY & IMMUNOLOGY

## 2025-08-13 PROCEDURE — 95149 ANTIGEN THERAPY SERVICES: CPT | Performed by: ALLERGY & IMMUNOLOGY

## 2025-08-15 DIAGNOSIS — L70.0 ACNE VULGARIS: Primary | ICD-10-CM

## 2025-08-15 RX ORDER — CLINDAMYCIN PHOSPHATE 10 MG/G
GEL TOPICAL 2 TIMES DAILY
Qty: 60 G | Refills: 3 | Status: SHIPPED | OUTPATIENT
Start: 2025-08-15

## 2025-08-27 ENCOUNTER — CLINICAL SUPPORT (OUTPATIENT)
Dept: ALLERGY | Facility: CLINIC | Age: 70
End: 2025-08-27
Payer: COMMERCIAL

## 2025-08-27 DIAGNOSIS — J30.2 SEASONAL ALLERGIES: ICD-10-CM

## 2025-08-27 DIAGNOSIS — Z91.038 ALLERGY TO INSECT VENOM: ICD-10-CM

## 2025-08-27 PROCEDURE — 95149 ANTIGEN THERAPY SERVICES: CPT | Performed by: ALLERGY & IMMUNOLOGY

## 2025-08-27 PROCEDURE — 95117 IMMUNOTHERAPY INJECTIONS: CPT | Performed by: ALLERGY & IMMUNOLOGY

## 2025-09-03 ENCOUNTER — APPOINTMENT (OUTPATIENT)
Dept: ALLERGY | Facility: CLINIC | Age: 70
End: 2025-09-03
Payer: COMMERCIAL

## 2026-01-12 ENCOUNTER — APPOINTMENT (OUTPATIENT)
Dept: CARDIOLOGY | Facility: CLINIC | Age: 71
End: 2026-01-12
Payer: COMMERCIAL

## 2026-01-30 ENCOUNTER — APPOINTMENT (OUTPATIENT)
Facility: CLINIC | Age: 71
End: 2026-01-30
Payer: COMMERCIAL

## 2026-05-01 ENCOUNTER — APPOINTMENT (OUTPATIENT)
Dept: OPHTHALMOLOGY | Facility: CLINIC | Age: 71
End: 2026-05-01
Payer: COMMERCIAL